# Patient Record
Sex: MALE | Race: WHITE | Employment: FULL TIME | URBAN - METROPOLITAN AREA
[De-identification: names, ages, dates, MRNs, and addresses within clinical notes are randomized per-mention and may not be internally consistent; named-entity substitution may affect disease eponyms.]

---

## 2017-01-26 ENCOUNTER — PATIENT MESSAGE (OUTPATIENT)
Dept: INTERNAL MEDICINE CLINIC | Age: 57
End: 2017-01-26

## 2017-01-26 NOTE — TELEPHONE ENCOUNTER
----- Message from Mikael Dumont sent at 1/26/2017  9:39 AM EST -----  Regarding: Visit Follow-Up Question  Contact: [phone]  Good morning Dr Fracisco Moore all is well with you. I am late but was suppose to have blood tests quarterly for anemia and looking at my record, last blood panel for everything was late July and you had ordered repeat in six months. Can I schedule with the lab for blood tests?       Stefanie Wallace

## 2017-02-06 DIAGNOSIS — R73.9 HYPERGLYCEMIA: ICD-10-CM

## 2017-02-06 DIAGNOSIS — E78.5 HYPERLIPIDEMIA, UNSPECIFIED HYPERLIPIDEMIA TYPE: Primary | ICD-10-CM

## 2017-02-06 DIAGNOSIS — D64.9 ANEMIA, UNSPECIFIED TYPE: ICD-10-CM

## 2017-02-06 DIAGNOSIS — R73.03 PREDIABETES: ICD-10-CM

## 2017-02-08 ENCOUNTER — LAB ONLY (OUTPATIENT)
Dept: INTERNAL MEDICINE CLINIC | Age: 57
End: 2017-02-08

## 2017-02-09 LAB
ALBUMIN SERPL-MCNC: 4.2 G/DL (ref 3.5–5.5)
ALBUMIN/GLOB SERPL: 1.8 {RATIO} (ref 1.1–2.5)
ALP SERPL-CCNC: 47 IU/L (ref 39–117)
ALT SERPL-CCNC: 22 IU/L (ref 0–44)
AST SERPL-CCNC: 21 IU/L (ref 0–40)
BASOPHILS # BLD AUTO: 0 X10E3/UL (ref 0–0.2)
BASOPHILS NFR BLD AUTO: 0 %
BILIRUB SERPL-MCNC: 0.6 MG/DL (ref 0–1.2)
BUN SERPL-MCNC: 11 MG/DL (ref 6–24)
BUN/CREAT SERPL: 12 (ref 9–20)
CALCIUM SERPL-MCNC: 9 MG/DL (ref 8.7–10.2)
CHLORIDE SERPL-SCNC: 98 MMOL/L (ref 96–106)
CHOLEST SERPL-MCNC: 223 MG/DL (ref 100–199)
CO2 SERPL-SCNC: 29 MMOL/L (ref 18–29)
CREAT SERPL-MCNC: 0.93 MG/DL (ref 0.76–1.27)
EOSINOPHIL # BLD AUTO: 0.1 X10E3/UL (ref 0–0.4)
EOSINOPHIL NFR BLD AUTO: 1 %
ERYTHROCYTE [DISTWIDTH] IN BLOOD BY AUTOMATED COUNT: 13 % (ref 12.3–15.4)
EST. AVERAGE GLUCOSE BLD GHB EST-MCNC: 120 MG/DL
GLOBULIN SER CALC-MCNC: 2.3 G/DL (ref 1.5–4.5)
GLUCOSE SERPL-MCNC: 91 MG/DL (ref 65–99)
HBA1C MFR BLD: 5.8 % (ref 4.8–5.6)
HCT VFR BLD AUTO: 40.9 % (ref 37.5–51)
HDLC SERPL-MCNC: 65 MG/DL
HGB BLD-MCNC: 13.1 G/DL (ref 12.6–17.7)
IMM GRANULOCYTES # BLD: 0 X10E3/UL (ref 0–0.1)
IMM GRANULOCYTES NFR BLD: 0 %
INTERPRETATION, 910389: NORMAL
LDLC SERPL CALC-MCNC: 122 MG/DL (ref 0–99)
LYMPHOCYTES # BLD AUTO: 1.8 X10E3/UL (ref 0.7–3.1)
LYMPHOCYTES NFR BLD AUTO: 40 %
MCH RBC QN AUTO: 28.7 PG (ref 26.6–33)
MCHC RBC AUTO-ENTMCNC: 32 G/DL (ref 31.5–35.7)
MCV RBC AUTO: 90 FL (ref 79–97)
MONOCYTES # BLD AUTO: 0.4 X10E3/UL (ref 0.1–0.9)
MONOCYTES NFR BLD AUTO: 8 %
NEUTROPHILS # BLD AUTO: 2.3 X10E3/UL (ref 1.4–7)
NEUTROPHILS NFR BLD AUTO: 51 %
PLATELET # BLD AUTO: 226 X10E3/UL (ref 150–379)
POTASSIUM SERPL-SCNC: 4.3 MMOL/L (ref 3.5–5.2)
PROT SERPL-MCNC: 6.5 G/DL (ref 6–8.5)
RBC # BLD AUTO: 4.57 X10E6/UL (ref 4.14–5.8)
SODIUM SERPL-SCNC: 138 MMOL/L (ref 134–144)
TRIGL SERPL-MCNC: 180 MG/DL (ref 0–149)
VLDLC SERPL CALC-MCNC: 36 MG/DL (ref 5–40)
WBC # BLD AUTO: 4.5 X10E3/UL (ref 3.4–10.8)

## 2017-02-14 ENCOUNTER — OFFICE VISIT (OUTPATIENT)
Dept: INTERNAL MEDICINE CLINIC | Age: 57
End: 2017-02-14

## 2017-02-14 VITALS
TEMPERATURE: 98.6 F | HEIGHT: 71 IN | OXYGEN SATURATION: 98 % | DIASTOLIC BLOOD PRESSURE: 61 MMHG | HEART RATE: 68 BPM | RESPIRATION RATE: 16 BRPM | BODY MASS INDEX: 27.44 KG/M2 | SYSTOLIC BLOOD PRESSURE: 105 MMHG | WEIGHT: 196 LBS

## 2017-02-14 DIAGNOSIS — E78.5 HYPERLIPIDEMIA, UNSPECIFIED HYPERLIPIDEMIA TYPE: ICD-10-CM

## 2017-02-14 DIAGNOSIS — Z11.59 NEED FOR HEPATITIS C SCREENING TEST: ICD-10-CM

## 2017-02-14 DIAGNOSIS — M47.22 OSTEOARTHRITIS OF SPINE WITH RADICULOPATHY, CERVICAL REGION: ICD-10-CM

## 2017-02-14 DIAGNOSIS — R07.81 PLEURITIC CHEST PAIN: Primary | ICD-10-CM

## 2017-02-14 DIAGNOSIS — R73.03 PREDIABETES: ICD-10-CM

## 2017-02-14 DIAGNOSIS — D64.9 ANEMIA, UNSPECIFIED TYPE: ICD-10-CM

## 2017-02-14 NOTE — MR AVS SNAPSHOT
Visit Information Date & Time Provider Department Dept. Phone Encounter #  
 2/14/2017  2:30 PM Kelly Arcos DO [facility] [id number] Follow-up Instructions Return for 6 month follow up w/ labs. Upcoming Health Maintenance Date Due Hepatitis C Screening 1960 FOBT Q 1 YEAR AGE 50-75 4/13/2010 INFLUENZA AGE 9 TO ADULT 8/1/2016 DTaP/Tdap/Td series (2 - Td) 7/15/2024 Allergies as of 2/14/2017  Review Complete On: 2/14/2017 By: Kelly Arcos DO No Known Allergies Current Immunizations  Never Reviewed Name Date Tdap 7/15/2014  9:13 AM  
  
 Not reviewed this visit You Were Diagnosed With   
  
 Codes Comments Pleuritic chest pain    -  Primary ICD-10-CM: R07.81 ICD-9-CM: 786.52 Hyperlipidemia, unspecified hyperlipidemia type     ICD-10-CM: E78.5 ICD-9-CM: 272.4 Prediabetes     ICD-10-CM: R73.03 
ICD-9-CM: 790.29 Vitals BP Pulse Temp Resp Height(growth percentile) Weight(growth percentile) 105/61 (BP 1 Location: Left arm, BP Patient Position: Sitting) 68 98.6 °F (37 °C) (Oral) 16 5' 11\" (1.803 m) 196 lb (88.9 kg) SpO2 BMI Smoking Status 98% 27.34 kg/m2 Never Smoker Vitals History BMI and BSA Data Body Mass Index Body Surface Area  
 27.34 kg/m 2 2.11 m 2 Preferred Pharmacy Pharmacy Name Phone [address] [phone] Your Updated Medication List  
  
   
This list is accurate as of: 2/14/17  2:54 PM.  Always use your most recent med list.  
  
  
  
  
 guaiFENesin-codeine 100-10 mg/5 mL solution Commonly known as:  ROBITUSSIN AC Take 5 mL by mouth nightly as needed for Cough. Max Daily Amount: 5 mL. meloxicam 15 mg tablet Commonly known as:  MOBIC  
  
 OTHER Take 2 Caps by mouth daily. Melaleuca peak performance nutritional pack. rosuvastatin 10 mg tablet Commonly known as:  CRESTOR Take 1 Tab by mouth nightly. Follow-up Instructions Return for 6 month follow up w/ labs. To-Do List   
 02/14/2017 ECG:  EKG, 12 LEAD, INITIAL   
  
 02/14/2017 Imaging:  XR CHEST PA LAT Patient Instructions Musculoskeletal Chest Pain: Care Instructions Your Care Instructions Chest pain is not always a sign that something is wrong with your heart or that you have another serious problem. The doctor thinks your chest pain is caused by strained muscles or ligaments, inflamed chest cartilage, or another problem in your chest, rather than by your heart. You may need more tests to find the cause of your chest pain. Follow-up care is a key part of your treatment and safety. Be sure to make and go to all appointments, and call your doctor if you are having problems. Its also a good idea to know your test results and keep a list of the medicines you take. How can you care for yourself at home? · Take pain medicines exactly as directed. ¨ If the doctor gave you a prescription medicine for pain, take it as prescribed. ¨ If you are not taking a prescription pain medicine, ask your doctor if you can take an over-the-counter medicine. · Rest and protect the sore area. · Stop, change, or take a break from any activity that may be causing your pain or soreness. · Put ice or a cold pack on the sore area for 10 to 20 minutes at a time. Try to do this every 1 to 2 hours for the next 3 days (when you are awake) or until the swelling goes down. Put a thin cloth between the ice and your skin. · After 2 or 3 days, apply a heating pad set on low or a warm cloth to the area that hurts. Some doctors suggest that you go back and forth between hot and cold. · Do not wrap or tape your ribs for support. This may cause you to take smaller breaths, which could increase your risk of lung problems. · Mentholated creams such as Bengay or Icy Hot may soothe sore muscles. Follow the instructions on the package. · Follow your doctor's instructions for exercising. · Gentle stretching and massage may help you get better faster. Stretch slowly to the point just before pain begins, and hold the stretch for at least 15 to 30 seconds. Do this 3 or 4 times a day. Stretch just after you have applied heat. · As your pain gets better, slowly return to your normal activities. Any increased pain may be a sign that you need to rest a while longer. When should you call for help? Call 911 anytime you think you may need emergency care. For example, call if: 
· You have chest pain or pressure. This may occur with: ¨ Sweating. ¨ Shortness of breath. ¨ Nausea or vomiting. ¨ Pain that spreads from the chest to the neck, jaw, or one or both shoulders or arms. ¨ Dizziness or lightheadedness. ¨ A fast or uneven pulse. After calling 911, chew 1 adult-strength aspirin. Wait for an ambulance. Do not try to drive yourself. · You have sudden chest pain and shortness of breath, or you cough up blood. Call your doctor now or seek immediate medical care if: 
· You have any trouble breathing. · Your chest pain gets worse. · Your chest pain occurs consistently with exercise and is relieved by rest. 
Watch closely for changes in your health, and be sure to contact your doctor if: 
· Your chest pain does not get better after 1 week. Where can you learn more? Go to http://magda-abdoul.info/. Enter V293 in the search box to learn more about \"Musculoskeletal Chest Pain: Care Instructions. \" Current as of: May 27, 2016 Content Version: 11.1 © 0090-3299 EDITD. Care instructions adapted under license by goAct (which disclaims liability or warranty for this information).  If you have questions about a medical condition or this instruction, always ask your healthcare professional. [address] any warranty or liability for your use of this information. Introducing [facility]! Dear Debbie Rogers: Thank you for requesting a [email or web address] account. Our records indicate that you already have an active [email or web address] account. You can access your account anytime at [email or web address] Did you know that you can access your hospital and ER discharge instructions at any time in [email or web address]? You can also review all of your test results from your hospital stay or ER visit. Additional Information If you have questions, please visit the Frequently Asked Questions section of the [email or web address] website at [email or web address] Remember, [email or web address] is NOT to be used for urgent needs. For medical emergencies, dial 911. Now available from your iPhone and Android! Please provide this summary of care documentation to your next provider. Your primary care clinician is listed as Tra Cline. If you have any questions after today's visit, please call [phone].

## 2017-02-14 NOTE — PATIENT INSTRUCTIONS
Musculoskeletal Chest Pain: Care Instructions  Your Care Instructions  Chest pain is not always a sign that something is wrong with your heart or that you have another serious problem. The doctor thinks your chest pain is caused by strained muscles or ligaments, inflamed chest cartilage, or another problem in your chest, rather than by your heart. You may need more tests to find the cause of your chest pain. Follow-up care is a key part of your treatment and safety. Be sure to make and go to all appointments, and call your doctor if you are having problems. Its also a good idea to know your test results and keep a list of the medicines you take. How can you care for yourself at home? · Take pain medicines exactly as directed. ¨ If the doctor gave you a prescription medicine for pain, take it as prescribed. ¨ If you are not taking a prescription pain medicine, ask your doctor if you can take an over-the-counter medicine. · Rest and protect the sore area. · Stop, change, or take a break from any activity that may be causing your pain or soreness. · Put ice or a cold pack on the sore area for 10 to 20 minutes at a time. Try to do this every 1 to 2 hours for the next 3 days (when you are awake) or until the swelling goes down. Put a thin cloth between the ice and your skin. · After 2 or 3 days, apply a heating pad set on low or a warm cloth to the area that hurts. Some doctors suggest that you go back and forth between hot and cold. · Do not wrap or tape your ribs for support. This may cause you to take smaller breaths, which could increase your risk of lung problems. · Mentholated creams such as Bengay or Icy Hot may soothe sore muscles. Follow the instructions on the package. · Follow your doctor's instructions for exercising. · Gentle stretching and massage may help you get better faster. Stretch slowly to the point just before pain begins, and hold the stretch for at least 15 to 30 seconds.  Do this 3 or 4 times a day. Stretch just after you have applied heat. · As your pain gets better, slowly return to your normal activities. Any increased pain may be a sign that you need to rest a while longer. When should you call for help? Call 911 anytime you think you may need emergency care. For example, call if:  · You have chest pain or pressure. This may occur with:  ¨ Sweating. ¨ Shortness of breath. ¨ Nausea or vomiting. ¨ Pain that spreads from the chest to the neck, jaw, or one or both shoulders or arms. ¨ Dizziness or lightheadedness. ¨ A fast or uneven pulse. After calling 911, chew 1 adult-strength aspirin. Wait for an ambulance. Do not try to drive yourself. · You have sudden chest pain and shortness of breath, or you cough up blood. Call your doctor now or seek immediate medical care if:  · You have any trouble breathing. · Your chest pain gets worse. · Your chest pain occurs consistently with exercise and is relieved by rest.  Watch closely for changes in your health, and be sure to contact your doctor if:  · Your chest pain does not get better after 1 week. Where can you learn more? Go to http://magda-abdoul.info/. Enter V293 in the search box to learn more about \"Musculoskeletal Chest Pain: Care Instructions. \"  Current as of: May 27, 2016  Content Version: 11.1  © 5223-3269 Framebridge. Care instructions adapted under license by [employer] (which disclaims liability or warranty for this information). If you have questions about a medical condition or this instruction, always ask your healthcare professional. [place] [address] any warranty or liability for your use of this information.

## 2017-02-14 NOTE — PROGRESS NOTES
Patient is in the office today for 6 month follow up. Do you have an Advance Directive no  Do you want more information no    1. Have you been to the ER, urgent care clinic since your last visit? Hospitalized since your last visit? No    2. Have you seen or consulted any other health care providers outside of the [address] [place] [address] since your last visit? Include any pap smears or colon screening.  No

## 2017-02-15 ENCOUNTER — TELEPHONE (OUTPATIENT)
Dept: INTERNAL MEDICINE CLINIC | Age: 57
End: 2017-02-15

## 2017-02-15 NOTE — TELEPHONE ENCOUNTER
Called patient,  verified. I advised of negative CXR and borderline EKG with abnormal T waves. Patient with CP only with taking deep breaths. He has had recent workout to upper chest and back. He says he has has MSK CP before and this felt different. Will try to locate actual image of EKG and plan to refer to cards. 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 In the interim ED precautions reviewed. Based on Hx patient low risk for ACS.

## 2017-02-16 ENCOUNTER — TELEPHONE (OUTPATIENT)
Dept: INTERNAL MEDICINE CLINIC | Age: 57
End: 2017-02-16

## 2017-02-16 NOTE — TELEPHONE ENCOUNTER
Pt request return call from Dr Laura Rao. He has further questions re abnormal EKG and plan of care. Also he is going out of town tomorrow 02/17/2017 thru Monday.

## 2017-02-16 NOTE — TELEPHONE ENCOUNTER
Called patient, he had questions about EKG. I advised that I have not seen the actual image but the report read nonspecific T was abnormality, borderline EKG. I advised that given clinical picture ACS unlikely, offered reassurance. Emergent ED precautions reviewed, patient agreed to comply.

## 2017-02-19 NOTE — PROGRESS NOTES
HISTORY OF PRESENT ILLNESS  Lind Skiff is a 64 y.o. male. HPI   Patient is in today for 6 month follow  Up on Anemia, HLD, prediabetes and migraines. He reports hx of kidney stones, hx pneumonia. He also reports hx of TIA 2 years ago. Patient works is retired fire/rescue, he is now a Captain     He says she recently did 21 day fast, h lost 21 lbs in the process. He reports recent episode of left sided chest cavity pain, \"nagging\" with deep breaths, 10/10, sharp, stabbing and unrelenting. He says it lasted a few minutes, he says that he got on his knees a and sat up and this helped. He denies inciting events. He does report that he had done back exercises a few days before. He says he has had intercostal pain previously and this felt different. He denies calf pain, swelling or erythema  Today he denies pain    He reports hx of cervical DDD with radiculopathy, b/l knee surgeries. Patient does have a hx of smoking cigars 3-4 times per year. He says he drinks alcohol 3-4 times per year. Patient was adopted and fhx is unknown. He is taking his medications with no adverse side effects, he is requesting refills today. He denies CP, SOB, dyspnea, edema, N/T or myalgias. Patient says he was not fasting when he had his labs drawn. No Known Allergies    Past Medical History   Diagnosis Date    Calculus of kidney     Headache     Joint pain     Migraine     Night sweats     Urine frequency        Family History   Problem Relation Age of Onset    Adopted: Yes       Social History   Substance Use Topics    Smoking status: Never Smoker    Smokeless tobacco: Never Used    Alcohol use 0.0 oz/week     0 Glasses of wine per week      Comment: socially         Current Outpatient Prescriptions   Medication Sig    rosuvastatin (CRESTOR) 10 mg tablet Take 1 Tab by mouth nightly.  meloxicam (MOBIC) 15 mg tablet     OTHER Take 2 Caps by mouth daily.  Melaleuca peak performance nutritional pack.    guaiFENesin-codeine (ROBITUSSIN AC) 100-10 mg/5 mL solution Take 5 mL by mouth nightly as needed for Cough. Max Daily Amount: 5 mL. No current facility-administered medications for this visit. Past Surgical History   Procedure Laterality Date    Hx orthopaedic       shoulders and knees scoped.  Hx other surgical       inguinal hernia.  Hx mohs procedure Left 2007     ROS  Constitutional: Negative for fever and chills. HENT: Negative for tinnitus. Eyes: Negative for blurred vision and double vision. Respiratory: Negative for cough and shortness of breath. Cardiovascular: See HPI. Negative for chest pain and leg swelling. Gastrointestinal: Negative for nausea, vomiting and diarrhea. Genitourinary: Negative for dysuria and flank pain. Musculoskeletal: See HPI  Neurological: See HPI. Negative for dizziness, tremors, sensory change, speech change, focal weakness and headaches. Psychiatric/Behavioral: Negative for depression and suicidal ideas. Visit Vitals    /61 (BP 1 Location: Left arm, BP Patient Position: Sitting)    Pulse 68    Temp 98.6 °F (37 °C) (Oral)    Resp 16    Ht 5' 11\" (1.803 m)    Wt 196 lb (88.9 kg)    SpO2 98%    BMI 27.34 kg/m2     Physical Exam  Constitutional: he is oriented to person, place, and time and well-developed, well-nourished, and in no distress. Head: Normocephalic and atraumatic. Right Ear: External ear normal.   Left Ear: External ear normal.   Eyes: Pupils are equal, round, and reactive to light. Neck: Normal range of motion. Cardiovascular: Normal rate, regular rhythm, normal heart sounds and intact distal pulses. No murmur heard. Pulmonary/Chest: Effort normal and breath sounds normal. No respiratory distress. Musculoskeletal: Normal range of motion. he exhibits no edema, tenderness, erythema. Neurological: he is alert and oriented to person, place, and time. Gait normal.   Skin: Skin is warm and dry. Psychiatric: Mood, memory, affect and judgment normal.     Lab Results   Component Value Date/Time    WBC 4.5 02/08/2017 12:00 AM    HGB 13.1 02/08/2017 12:00 AM    HCT 40.9 02/08/2017 12:00 AM    PLATELET 541 20/87/3997 12:00 AM    MCV 90 02/08/2017 12:00 AM     Lab Results   Component Value Date/Time    Sodium 138 02/08/2017 12:00 AM    Potassium 4.3 02/08/2017 12:00 AM    Chloride 98 02/08/2017 12:00 AM    CO2 29 02/08/2017 12:00 AM    Anion gap 6 10/20/2016 08:17 AM    Glucose 91 02/08/2017 12:00 AM    BUN 11 02/08/2017 12:00 AM    Creatinine 0.93 02/08/2017 12:00 AM    BUN/Creatinine ratio 12 02/08/2017 12:00 AM    GFR est  02/08/2017 12:00 AM    GFR est non-AA 91 02/08/2017 12:00 AM    Calcium 9.0 02/08/2017 12:00 AM    Bilirubin, total 0.6 02/08/2017 12:00 AM    AST (SGOT) 21 02/08/2017 12:00 AM    Alk. phosphatase 47 02/08/2017 12:00 AM    Protein, total 6.5 02/08/2017 12:00 AM    Albumin 4.2 02/08/2017 12:00 AM    Globulin 3.0 10/20/2016 08:17 AM    A-G Ratio 1.8 02/08/2017 12:00 AM    ALT (SGPT) 22 02/08/2017 12:00 AM     Lab Results   Component Value Date/Time    Cholesterol, total 223 02/08/2017 12:00 AM    HDL Cholesterol 65 02/08/2017 12:00 AM    LDL, calculated 122 02/08/2017 12:00 AM    VLDL, calculated 36 02/08/2017 12:00 AM    Triglyceride 180 02/08/2017 12:00 AM    CHOL/HDL Ratio 3.1 09/10/2014 05:06 AM     Lab Results   Component Value Date/Time    TSH 0.863 08/01/2016 08:47 AM     Lab Results   Component Value Date/Time    Hemoglobin A1c 5.8 02/08/2017 12:00 AM     ASSESSMENT and PLAN    ICD-10-CM ICD-9-CM    1. Pleuritic chest pain R07.81 786.52 XR CHEST PA LAT      EKG, 12 LEAD, INITIAL   2. Hyperlipidemia, unspecified hyperlipidemia type E78.5 272.4 LIPID PANEL   3. Prediabetes U85.09 294.89 METABOLIC PANEL, COMPREHENSIVE      HEMOGLOBIN A1C WITH EAG   4. Anemia, unspecified type D64.9 285.9 CBC WITH AUTOMATED DIFF      METABOLIC PANEL, COMPREHENSIVE   5.  Osteoarthritis of spine with radiculopathy, cervical region M47.22 721.0    6. Need for hepatitis C screening test Z11.59 V73.89 HEPATITIS C AB     -Ctn current healthy regimen  -CP, likely MSK given hx and PE. Pain with deep breath following back exercises days prior. Unlikely to be angina, in the absence of leg pain or swelling decrease likelihood of PE, no URI symptoms so unlikely to me pulm in nature. -RTC 6 mos follow up w/ labs      -Patient classified as obese  -Advised continued exercise and dietary modifications.   -Patient agrees with assessment and plan    According to the CDC an increased BMI can lead to \"all-causes of death (mortality), High blood pressure (Hypertension), High LDL cholesterol, low HDL cholesterol, or high levels of triglycerides (Dyslipidemia), Type 2 diabetes, Coronary heart disease, Stroke, Gallbladder disease, Osteoarthritis (a breakdown of cartilage and bone within a joint), Sleep apnea and breathing problems, Chronic inflammation and increased oxidative stress, some cancers (endometrial, breast, colon, kidney, gallbladder, and liver), Low quality of life, Mental illness such as clinical depression, anxiety, and other mental disorders and Body pain and difficulty with physical functioning. \"    BMI Weight Status   Below 18.5 Underweight   18.5 - 24.9 Normal or Healthy Weight   25.0 - 29.9 Overweight   30.0 and Above Obese   40.0 and Above Morbid Obesity     Additional Instructions: The patient understands that they should contact the office at any time if any questions or concerns develop. They are also aware that they can call our main office number at [phone] at any time if they would like to address any concerns with the physician. They also understand that they should dial 911 if any acute emergency arises. The patient understands that they should give us a minimum of 48 hours to complete prescription refills once they are requested.   The patient has also been instructed to contact us by calling the main office number if they have not received feedback within 2 weeks of having any tests completed. The patient is a aware that they should read all package insert information when picking up the medications and that they should consult the pharmacist of a physician if they have any questions or concerns regarding the prescribed medications. Discussed with the patient new medications given and patient instructed to read pharmacy literature regarding side effects and drug interactions. Instructions for taking the medications were provided to the patient and the consequences of not taking it. Follow-up Disposition:   Return if symptoms worsen or fail to improve. Risk and benefits of new medication discussed in detail when indicated, patient was given the opportunity to ask questions   AVS provided  reviewed diet, exercise and weight control when indicated  Alarm signals discussed. ER precautions reviewed when indicated  Plan of care reviewed with patient. Understanding verbalized and they are in agreement with plan of care. Please note that this document was created with voice recognition software. Unrecognized errors in transcription may be present.     Derik Lyon, DO

## 2017-02-27 DIAGNOSIS — R07.81 PLEURITIC CHEST PAIN: ICD-10-CM

## 2021-04-21 ENCOUNTER — VIRTUAL VISIT (OUTPATIENT)
Dept: FAMILY MEDICINE CLINIC | Age: 61
End: 2021-04-21
Payer: COMMERCIAL

## 2021-04-21 DIAGNOSIS — R20.2 NUMBNESS AND TINGLING IN LEFT ARM: ICD-10-CM

## 2021-04-21 DIAGNOSIS — E55.9 VITAMIN D DEFICIENCY: ICD-10-CM

## 2021-04-21 DIAGNOSIS — R20.0 NUMBNESS AND TINGLING OF LEFT LEG: ICD-10-CM

## 2021-04-21 DIAGNOSIS — M47.22 OSTEOARTHRITIS OF SPINE WITH RADICULOPATHY, CERVICAL REGION: ICD-10-CM

## 2021-04-21 DIAGNOSIS — R20.2 NUMBNESS AND TINGLING OF LEFT LEG: ICD-10-CM

## 2021-04-21 DIAGNOSIS — R29.898 WEAKNESS OF LEFT LEG: ICD-10-CM

## 2021-04-21 DIAGNOSIS — Z11.59 ENCOUNTER FOR HEPATITIS C SCREENING TEST FOR LOW RISK PATIENT: ICD-10-CM

## 2021-04-21 DIAGNOSIS — G43.009 MIGRAINE WITHOUT AURA AND WITHOUT STATUS MIGRAINOSUS, NOT INTRACTABLE: ICD-10-CM

## 2021-04-21 DIAGNOSIS — R73.03 PREDIABETES: ICD-10-CM

## 2021-04-21 DIAGNOSIS — J35.1 SWELLING OF TONSIL: ICD-10-CM

## 2021-04-21 DIAGNOSIS — E78.5 HYPERLIPIDEMIA, UNSPECIFIED HYPERLIPIDEMIA TYPE: Primary | ICD-10-CM

## 2021-04-21 DIAGNOSIS — R20.0 NUMBNESS AND TINGLING IN LEFT ARM: ICD-10-CM

## 2021-04-21 PROCEDURE — 99204 OFFICE O/P NEW MOD 45 MIN: CPT | Performed by: NURSE PRACTITIONER

## 2021-04-21 RX ORDER — NAPROXEN 500 MG/1
500 TABLET ORAL 2 TIMES DAILY WITH MEALS
Qty: 30 TAB | Refills: 0 | Status: SHIPPED | OUTPATIENT
Start: 2021-04-21 | End: 2021-07-12

## 2021-04-21 RX ORDER — AMOXICILLIN 500 MG/1
TABLET, FILM COATED ORAL
COMMUNITY
Start: 2021-04-15 | End: 2021-04-28

## 2021-04-21 RX ORDER — DOXYCYCLINE 100 MG/1
100 TABLET ORAL 2 TIMES DAILY
Qty: 20 TAB | Refills: 0 | Status: SHIPPED | OUTPATIENT
Start: 2021-04-21 | End: 2021-05-01

## 2021-04-21 NOTE — PROGRESS NOTES
Nieves Hickman presents today for   Chief Complaint   Patient presents with    Neck Pain     Visit to establish care and needs a referral for Neuro.  Other     c/o or swollen tonsils and submadibular area. ( Recent visit to Urgent care ) 2 days of abx left. Virtual/telephone visit    Depression Screening:  3 most recent PHQ Screens 4/21/2021   Little interest or pleasure in doing things Not at all   Feeling down, depressed, irritable, or hopeless Not at all   Total Score PHQ 2 0       Learning Assessment:  Learning Assessment 4/21/2021   PRIMARY LEARNER Patient   HIGHEST LEVEL OF EDUCATION - PRIMARY LEARNER  4 YEARS OF COLLEGE   BARRIERS PRIMARY LEARNER NONE   CO-LEARNER CAREGIVER No   PRIMARY LANGUAGE ENGLISH   LEARNER PREFERENCE PRIMARY DEMONSTRATION   ANSWERED BY Patient   RELATIONSHIP SELF       Fall Risk  Fall Risk Assessment, last 12 mths 4/21/2021   Able to walk? Yes   Fall in past 12 months? 0   Do you feel unsteady? 0       Travel Screening:   Travel Screening     Question   Response    In the last month, have you been in contact with someone who was confirmed or suspected to have Coronavirus / COVID-19? No / Unsure    Have you had a COVID-19 viral test in the last 14 days? Yes - Negative result (4/7/21 related travel)    Do you have any of the following new or worsening symptoms? None of these    Have you traveled internationally or domestically in the last month? No      Travel History   Travel since 03/21/21     No documented travel since 03/21/21          Health Maintenance reviewed and discussed and ordered per Provider. Health Maintenance Due   Topic Date Due    Hepatitis C Screening  Never done    COVID-19 Vaccine (1) Never done    Shingrix Vaccine Age 50> (1 of 2) Never done    A1C test (Diabetic or Prediabetic)  02/08/2018   . Coordination of Care:  1. Have you been to the ER, urgent care clinic since your last visit? Hospitalized since your last visit?  4/15/21 [facility] r/t swollin tonsil. 2. Have you seen or consulted any other health care providers outside of the [address] [place] [address] since your last visit? Include any pap smears or colon screening. Noted above.

## 2021-04-21 NOTE — PROGRESS NOTES
Mary Jane Byers is a 64 y.o. male who was seen by synchronous (real-time) audio-video technology on 4/21/2021 for Neck Pain (Visit to establish care and needs a referral for Neuro.) and Other (c/o or swollen tonsils and submadibular area. ( Recent visit to Urgent care ) 2 days of abx left. )  New patient - has not had a provider. He reports a physical once a year and he is usually pretty healthy. He was in [place] on a work assignment in Oct 2020 and went to the ER for Neck pain which they attributed to a migraine. He was having some vision changes at this time. - He has had a MRI and x-ray of his chest, brain and back. He went to ER in [place] and was told he needed to see a neurosurgeon so he is in need of a referral. He does have some radiating pain in his left arm and left leg with some numbness since then. He does have some weakness in his left leg. He has no problem with urinating or having a bowel movement. He denies any chest pain and or shortness of breath. He reports he continues to have headaches. His wife does work in PT and given him some light traction which helps some with his neck pain but not with his headaches. He reports his headaches are positional and occur when he moves a certain way as if he turns his head a certain way the pain will shoot down his left arm. He reports significant arthritis in his neck. He is taking ibuprofen for his headache and neck pain and he reports this does very little. He did take 600 mg Ibuprofen but this upset his stomach. He has tried Aleve but this has not helped. He also went to urgent care about a week ago for his tonsils. He was started on penicillin for 10 days. He reports his right tonsil has some pus pockets. He repots he has had no change since starting the medication and he still has 3 days left. He denies any fevers. He reports his pain level is 1-2. He was not able to get his COVID vaccine because of the gland that was swollen in his neck.    He has had a vitamin D deficiency and was taking vitamin D but has not had this in a while. Assessment & Plan:   Diagnoses and all orders for this visit:    1. Hyperlipidemia, unspecified hyperlipidemia type  -     METABOLIC PANEL, COMPREHENSIVE; Future  -     LIPID PANEL; Future  -     CBC WITH AUTOMATED DIFF; Future  -     TSH 3RD GENERATION; Future  -     T4, FREE; Future    2. Prediabetes  -     METABOLIC PANEL, COMPREHENSIVE; Future  -     LIPID PANEL; Future  -     CBC WITH AUTOMATED DIFF; Future  -     TSH 3RD GENERATION; Future  -     T4, FREE; Future  -     HEMOGLOBIN A1C WITH EAG; Future    3. Migraine without aura and without status migrainosus, not intractable  -     naproxen (NAPROSYN) 500 mg tablet; Take 1 Tab by mouth two (2) times daily (with meals). 4. Osteoarthritis of spine with radiculopathy, cervical region  -     naproxen (NAPROSYN) 500 mg tablet; Take 1 Tab by mouth two (2) times daily (with meals). -     REFERRAL TO NEUROSURGERY    5. Swelling of tonsil  -     doxycycline (ADOXA) 100 mg tablet; Take 1 Tab by mouth two (2) times a day for 10 days. -     CBC WITH AUTOMATED DIFF; Future  -     REFERRAL TO ENT-OTOLARYNGOLOGY    6. Encounter for hepatitis C screening test for low risk patient  -     HEPATITIS C AB; Future    7. Vitamin D deficiency  -     VITAMIN D, 25 HYDROXY; Future    8. Numbness and tingling in left arm  -     REFERRAL TO NEUROSURGERY    9. Numbness and tingling of left leg  -     REFERRAL TO NEUROSURGERY    10. Weakness of left leg  -     REFERRAL TO NEUROSURGERY    Medication, side effects, possible allergic reactions and warnings reviewed with patient. Patient verbalized understanding. Encouraged to take a probiotic mid-day. Aleve with food. Subjective:       Prior to Admission medications    Medication Sig Start Date End Date Taking?  Authorizing Provider   amoxicillin 500 mg tab TAKE 1 TABLET BY MOUTH TWICE DAILY 4/15/21  Yes Provider, Historical   doxycycline (ADOXA) 100 mg tablet Take 1 Tab by mouth two (2) times a day for 10 days. 4/21/21 5/1/21 Yes Arielle Petit NP   naproxen (NAPROSYN) 500 mg tablet Take 1 Tab by mouth two (2) times daily (with meals). 4/21/21  Yes Alvina GREENBERG, NP   tadalafiL (CIALIS) 5 mg tablet Take 1 Tab by mouth daily. For ED. Indications: the inability to have an erection 6/17/20  Yes jR aPrtida MD     Patient Active Problem List   Diagnosis Code    Osteoarthritis of spine with radiculopathy, cervical region M47.22    Hx of herpes zoster Z86.19    Hx-TIA (transient ischemic attack) Z86.73    Frequency of urination R35.0    Migraine without status migrainosus, not intractable G43.909    Headache(784.0) R51    TIA (transient ischemic attack) G45.9    Hyperlipidemia E78.5    Prediabetes R73.03    Anemia D64.9     Patient Active Problem List    Diagnosis Date Noted    Hyperlipidemia 08/02/2016    Prediabetes 08/02/2016    Anemia 08/02/2016    Osteoarthritis of spine with radiculopathy, cervical region 07/28/2016    Hx of herpes zoster 07/28/2016    Hx-TIA (transient ischemic attack) 07/28/2016    Frequency of urination 07/28/2016    Migraine without status migrainosus, not intractable 07/28/2016    Headache(784.0) 09/09/2014    TIA (transient ischemic attack) 09/09/2014     Current Outpatient Medications   Medication Sig Dispense Refill    amoxicillin 500 mg tab TAKE 1 TABLET BY MOUTH TWICE DAILY      doxycycline (ADOXA) 100 mg tablet Take 1 Tab by mouth two (2) times a day for 10 days. 20 Tab 0    naproxen (NAPROSYN) 500 mg tablet Take 1 Tab by mouth two (2) times daily (with meals). 30 Tab 0    tadalafiL (CIALIS) 5 mg tablet Take 1 Tab by mouth daily. For ED.   Indications: the inability to have an erection 90 Tab 3     No Known Allergies  Past Medical History:   Diagnosis Date    Calculus of kidney     Headache     Joint pain     Migraine     Night sweats     Urine frequency      Past Surgical History:   Procedure Laterality Date    HX MOHS PROCEDURES Left 2007    HX ORTHOPAEDIC      shoulders and knees scoped.  HX OTHER SURGICAL      inguinal hernia. Family History   Adopted: Yes     Social History     Tobacco Use    Smoking status: Never Smoker    Smokeless tobacco: Never Used   Substance Use Topics    Alcohol use: Yes     Alcohol/week: 0.0 standard drinks     Comment: socially        Review of Systems   Constitutional: Negative for fever. HENT: Positive for sore throat. Eyes: Negative for blurred vision. Respiratory: Negative for cough and shortness of breath. Cardiovascular: Negative for chest pain. Gastrointestinal: Negative for abdominal pain, blood in stool, nausea and vomiting. Genitourinary: Negative. Musculoskeletal: Positive for back pain and neck pain. Neurological: Positive for tingling, weakness and headaches. Objective:   No flowsheet data found.      [INSTRUCTIONS:  \"[x]\" Indicates a positive item  \"[]\" Indicates a negative item  -- DELETE ALL ITEMS NOT EXAMINED]    Constitutional: [x] Appears well-developed and well-nourished [x] No apparent distress      [] Abnormal -     Mental status: [x] Alert and awake  [x] Oriented to person/place/time [x] Able to follow commands    [] Abnormal -     Eyes:   EOM    [x]  Normal    [] Abnormal -   Sclera  [x]  Normal    [] Abnormal -          Discharge [x]  None visible   [] Abnormal -     HENT: [x] Normocephalic, atraumatic  [] Abnormal -   [x] Mouth/Throat: Mucous membranes are moist    External Ears [x] Normal  [] Abnormal -    Neck: [x] No visualized mass [] Abnormal -     Pulmonary/Chest: [x] Respiratory effort normal   [x] No visualized signs of difficulty breathing or respiratory distress        [] Abnormal -      Musculoskeletal:   [x] Normal gait with no signs of ataxia         [x] Normal range of motion of neck        [] Abnormal -     Neurological:        [x] No Facial Asymmetry (Cranial nerve 7 motor function) (limited exam due to video visit)          [x] No gaze palsy        [] Abnormal -          Skin:        [x] No significant exanthematous lesions or discoloration noted on facial skin         [] Abnormal -            Psychiatric:       [x] Normal Affect [] Abnormal -        [x] No Hallucinations    We discussed the expected course, resolution and complications of the diagnosis(es) in detail. Medication risks, benefits, costs, interactions, and alternatives were discussed as indicated. I advised him to contact the office if his condition worsens, changes or fails to improve as anticipated. He expressed understanding with the diagnosis(es) and plan. Dino Aviles, was evaluated through a synchronous (real-time) audio-video encounter. The patient (or guardian if applicable) is aware that this is a billable service. Verbal consent to proceed has been obtained within the past 12 months. The visit was conducted pursuant to the emergency declaration under the [address] [place] [address] authority and the [email or web address] General Act. Patient identification was verified, and a caregiver was present when appropriate. The patient was located in a state where the provider was credentialed to provide care.       Bro Loera NP

## 2021-04-24 ENCOUNTER — HOSPITAL ENCOUNTER (OUTPATIENT)
Dept: LAB | Age: 61
Discharge: HOME | End: 2021-04-24
Payer: COMMERCIAL

## 2021-04-24 DIAGNOSIS — R73.03 PREDIABETES: ICD-10-CM

## 2021-04-24 DIAGNOSIS — E78.5 HYPERLIPIDEMIA, UNSPECIFIED HYPERLIPIDEMIA TYPE: ICD-10-CM

## 2021-04-24 DIAGNOSIS — E55.9 VITAMIN D DEFICIENCY: ICD-10-CM

## 2021-04-24 DIAGNOSIS — J35.1 SWELLING OF TONSIL: ICD-10-CM

## 2021-04-24 DIAGNOSIS — Z11.59 ENCOUNTER FOR HEPATITIS C SCREENING TEST FOR LOW RISK PATIENT: ICD-10-CM

## 2021-04-24 LAB
25(OH)D3 SERPL-MCNC: 20.9 NG/ML (ref 30–100)
ALBUMIN SERPL-MCNC: 4 G/DL (ref 3.4–5)
ALBUMIN/GLOB SERPL: 1.3 {RATIO} (ref 0.8–1.7)
ALP SERPL-CCNC: 48 U/L (ref 45–117)
ALT SERPL-CCNC: 39 U/L (ref 16–61)
ANION GAP SERPL CALC-SCNC: 3 MMOL/L (ref 3–18)
AST SERPL-CCNC: 24 U/L (ref 10–38)
BASOPHILS # BLD: 0 K/UL (ref 0–0.1)
BASOPHILS NFR BLD: 1 % (ref 0–2)
BILIRUB SERPL-MCNC: 1 MG/DL (ref 0.2–1)
BUN SERPL-MCNC: 11 MG/DL (ref 7–18)
BUN/CREAT SERPL: 14 (ref 12–20)
CALCIUM SERPL-MCNC: 8.5 MG/DL (ref 8.5–10.1)
CHLORIDE SERPL-SCNC: 104 MMOL/L (ref 100–111)
CHOLEST SERPL-MCNC: 250 MG/DL
CO2 SERPL-SCNC: 31 MMOL/L (ref 21–32)
CREAT SERPL-MCNC: 0.76 MG/DL (ref 0.6–1.3)
DIFFERENTIAL METHOD BLD: ABNORMAL
EOSINOPHIL # BLD: 0.1 K/UL (ref 0–0.4)
EOSINOPHIL NFR BLD: 1 % (ref 0–5)
ERYTHROCYTE [DISTWIDTH] IN BLOOD BY AUTOMATED COUNT: 12.8 % (ref 11.6–14.5)
EST. AVERAGE GLUCOSE BLD GHB EST-MCNC: 111 MG/DL
GLOBULIN SER CALC-MCNC: 3 G/DL (ref 2–4)
GLUCOSE SERPL-MCNC: 78 MG/DL (ref 74–99)
HBA1C MFR BLD: 5.5 % (ref 4.2–5.6)
HCT VFR BLD AUTO: 39.2 % (ref 36–48)
HDLC SERPL-MCNC: 79 MG/DL (ref 40–60)
HDLC SERPL: 3.2 {RATIO} (ref 0–5)
HGB BLD-MCNC: 12.9 G/DL (ref 13–16)
LDLC SERPL CALC-MCNC: 149.8 MG/DL (ref 0–100)
LIPID PROFILE,FLP: ABNORMAL
LYMPHOCYTES # BLD: 1.5 K/UL (ref 0.9–3.6)
LYMPHOCYTES NFR BLD: 35 % (ref 21–52)
MCH RBC QN AUTO: 29.1 PG (ref 24–34)
MCHC RBC AUTO-ENTMCNC: 32.9 G/DL (ref 31–37)
MCV RBC AUTO: 88.5 FL (ref 74–97)
MONOCYTES # BLD: 0.5 K/UL (ref 0.05–1.2)
MONOCYTES NFR BLD: 12 % (ref 3–10)
NEUTS SEG # BLD: 2.3 K/UL (ref 1.8–8)
NEUTS SEG NFR BLD: 52 % (ref 40–73)
PLATELET # BLD AUTO: 222 K/UL (ref 135–420)
PMV BLD AUTO: 9.9 FL (ref 9.2–11.8)
POTASSIUM SERPL-SCNC: 4.1 MMOL/L (ref 3.5–5.5)
PROT SERPL-MCNC: 7 G/DL (ref 6.4–8.2)
RBC # BLD AUTO: 4.43 M/UL (ref 4.35–5.65)
SODIUM SERPL-SCNC: 138 MMOL/L (ref 136–145)
T4 FREE SERPL-MCNC: 1 NG/DL (ref 0.7–1.5)
TRIGL SERPL-MCNC: 106 MG/DL (ref ?–150)
TSH SERPL DL<=0.05 MIU/L-ACNC: 1.01 UIU/ML (ref 0.36–3.74)
VLDLC SERPL CALC-MCNC: 21.2 MG/DL
WBC # BLD AUTO: 4.4 K/UL (ref 4.6–13.2)

## 2021-04-24 PROCEDURE — 36415 COLL VENOUS BLD VENIPUNCTURE: CPT

## 2021-04-24 PROCEDURE — 84443 ASSAY THYROID STIM HORMONE: CPT

## 2021-04-24 PROCEDURE — 80053 COMPREHEN METABOLIC PANEL: CPT

## 2021-04-24 PROCEDURE — 84439 ASSAY OF FREE THYROXINE: CPT

## 2021-04-24 PROCEDURE — 80061 LIPID PANEL: CPT

## 2021-04-24 PROCEDURE — 85025 COMPLETE CBC W/AUTO DIFF WBC: CPT

## 2021-04-24 PROCEDURE — 86803 HEPATITIS C AB TEST: CPT

## 2021-04-24 PROCEDURE — 83036 HEMOGLOBIN GLYCOSYLATED A1C: CPT

## 2021-04-24 PROCEDURE — 82306 VITAMIN D 25 HYDROXY: CPT

## 2021-04-26 LAB
HCV AB SER IA-ACNC: 0.11 INDEX
HCV AB SERPL QL IA: NEGATIVE
HCV COMMENT,HCGAC: NORMAL

## 2021-04-28 ENCOUNTER — TELEPHONE (OUTPATIENT)
Dept: FAMILY MEDICINE CLINIC | Age: 61
End: 2021-04-28

## 2021-04-28 NOTE — TELEPHONE ENCOUNTER
Patient said that the antibiotic is not working he still feel the samehugh and he has an headache.  Please advise

## 2021-05-20 ENCOUNTER — VIRTUAL VISIT (OUTPATIENT)
Dept: FAMILY MEDICINE CLINIC | Age: 61
End: 2021-05-20
Payer: COMMERCIAL

## 2021-05-20 DIAGNOSIS — E78.5 HYPERLIPIDEMIA, UNSPECIFIED HYPERLIPIDEMIA TYPE: ICD-10-CM

## 2021-05-20 DIAGNOSIS — Z71.2 ENCOUNTER TO DISCUSS TEST RESULTS: Primary | ICD-10-CM

## 2021-05-20 DIAGNOSIS — E55.9 VITAMIN D INSUFFICIENCY: ICD-10-CM

## 2021-05-20 DIAGNOSIS — J35.1 SWELLING OF TONSIL: ICD-10-CM

## 2021-05-20 DIAGNOSIS — G43.009 MIGRAINE WITHOUT AURA AND WITHOUT STATUS MIGRAINOSUS, NOT INTRACTABLE: ICD-10-CM

## 2021-05-20 DIAGNOSIS — M47.22 OSTEOARTHRITIS OF SPINE WITH RADICULOPATHY, CERVICAL REGION: ICD-10-CM

## 2021-05-20 PROCEDURE — 99214 OFFICE O/P EST MOD 30 MIN: CPT | Performed by: NURSE PRACTITIONER

## 2021-05-20 RX ORDER — TADALAFIL 5 MG/1
5 TABLET ORAL DAILY
COMMUNITY
End: 2021-07-12

## 2021-05-20 RX ORDER — METHOCARBAMOL 750 MG/1
TABLET, FILM COATED ORAL
COMMUNITY
Start: 2021-05-18 | End: 2021-07-12

## 2021-05-20 NOTE — PROGRESS NOTES
Mariangel Milian is a 64 y.o. male who was seen by synchronous (real-time) audio-video technology on 5/20/2021 for Follow-up (1 month), Headache (no improvement), Back Pain (pain level  4/10), and Results (discuss lab results)  He has been for his labs- his total cholesterol was in the 160's back in December. ENT - he has seen - he will have his tonsils removed tomorrow and pathology will be completed. Back pain, neck pain, and Headache- he did see the neurosurgeon and was given methocarbamol as the neurosurgeon felt the headache was coming from his neck pain. He has MRI's next week physical therapy, and a cortisone shot. Pain level today is 3/10 and for his neck pain 3-4/10. He does not have pain in his back today. Assessment & Plan:   Diagnoses and all orders for this visit:    1. Encounter to discuss test results    2. Hyperlipidemia, unspecified hyperlipidemia type  -     METABOLIC PANEL, COMPREHENSIVE; Future  -     LIPID PANEL; Future    3. Migraine without aura and without status migrainosus, not intractable    4. Osteoarthritis of spine with radiculopathy, cervical region    5. Swelling of tonsil    6. Vitamin D insufficiency  -     VITAMIN D, 25 HYDROXY; Future      Follow up with ENT and neurosurgeon. OTC vitamin D at 2000 units a day. Subjective:       Prior to Admission medications    Medication Sig Start Date End Date Taking? Authorizing Provider   methocarbamoL (ROBAXIN) 750 mg tablet TAKE 1 TABLET BY MOUTH FOUR TIMES DAILY 5/18/21  Yes Provider, Historical   tadalafiL (Cialis) 5 mg tablet Take 5 mg by mouth daily. Yes Provider, Historical   naproxen (NAPROSYN) 500 mg tablet Take 1 Tab by mouth two (2) times daily (with meals). 4/21/21  Yes Valerie GREENBERG NP   tadalafiL (CIALIS) 5 mg tablet Take 1 Tab by mouth daily. For ED.   Indications: the inability to have an erection 6/17/20   Andrei Richmond MD     Patient Active Problem List   Diagnosis Code    Osteoarthritis of spine with radiculopathy, cervical region M47.22    Hx of herpes zoster Z86.19    Hx-TIA (transient ischemic attack) Z86.73    Frequency of urination R35.0    Migraine without status migrainosus, not intractable G43.909    Headache(784.0) R51    TIA (transient ischemic attack) G45.9    Hyperlipidemia E78.5    Prediabetes R73.03    Anemia D64.9    Calculus of kidney N20.0    Night sweats R61    Urine frequency R35.0     Patient Active Problem List    Diagnosis Date Noted    Calculus of kidney     Night sweats     Urine frequency     Hyperlipidemia 08/02/2016    Prediabetes 08/02/2016    Anemia 08/02/2016    Osteoarthritis of spine with radiculopathy, cervical region 07/28/2016    Hx of herpes zoster 07/28/2016    Hx-TIA (transient ischemic attack) 07/28/2016    Frequency of urination 07/28/2016    Migraine without status migrainosus, not intractable 07/28/2016    Headache(784.0) 09/09/2014    TIA (transient ischemic attack) 09/09/2014     Current Outpatient Medications   Medication Sig Dispense Refill    methocarbamoL (ROBAXIN) 750 mg tablet TAKE 1 TABLET BY MOUTH FOUR TIMES DAILY      tadalafiL (Cialis) 5 mg tablet Take 5 mg by mouth daily.  naproxen (NAPROSYN) 500 mg tablet Take 1 Tab by mouth two (2) times daily (with meals). 30 Tab 0    tadalafiL (CIALIS) 5 mg tablet Take 1 Tab by mouth daily. For ED. Indications: the inability to have an erection 90 Tab 3     No Known Allergies  Past Medical History:   Diagnosis Date    Calculus of kidney     Headache     Joint pain     Migraine     Night sweats     Urine frequency      Past Surgical History:   Procedure Laterality Date    HX MOHS PROCEDURES Left 2007    HX ORTHOPAEDIC      shoulders and knees scoped.  HX OTHER SURGICAL      inguinal hernia. Family History   Adopted: Yes     Social History     Tobacco Use    Smoking status: Never Smoker    Smokeless tobacco: Never Used   Substance Use Topics    Alcohol use:  Yes Alcohol/week: 0.0 standard drinks     Comment: socially        Review of Systems   HENT: Negative for sore throat. Musculoskeletal: Positive for back pain and neck pain. Neurological: Positive for headaches. Objective:   No flowsheet data found. [INSTRUCTIONS:  \"[x]\" Indicates a positive item  \"[]\" Indicates a negative item  -- DELETE ALL ITEMS NOT EXAMINED]    Constitutional: [x] Appears well-developed and well-nourished [x] No apparent distress      [] Abnormal -     Mental status: [x] Alert and awake  [x] Oriented to person/place/time [x] Able to follow commands    [] Abnormal -     Eyes:   EOM    [x]  Normal    [] Abnormal -   Sclera  [x]  Normal    [] Abnormal -          Discharge [x]  None visible   [] Abnormal -     HENT: [x] Normocephalic, atraumatic  [x] Abnormal - Large right tonsil  [x] Mouth/Throat: Mucous membranes are moist    External Ears [x] Normal  [] Abnormal -    Neck: [x] No visualized mass [] Abnormal -     Pulmonary/Chest: [x] Respiratory effort normal   [x] No visualized signs of difficulty breathing or respiratory distress        [] Abnormal -      Musculoskeletal:   [x] Normal gait with no signs of ataxia         [] Normal range of motion of neck        [] Abnormal - Limited ROM in neck when turning head to both sides. Neurological:        [x] No Facial Asymmetry (Cranial nerve 7 motor function) (limited exam due to video visit)          [x] No gaze palsy        [] Abnormal -          Skin:        [x] No significant exanthematous lesions or discoloration noted on facial skin         [] Abnormal -            Psychiatric:       [x] Normal Affect [] Abnormal -        [x] No Hallucinations      We discussed the expected course, resolution and complications of the diagnosis(es) in detail. Medication risks, benefits, costs, interactions, and alternatives were discussed as indicated.   I advised him to contact the office if his condition worsens, changes or fails to improve as anticipated. He expressed understanding with the diagnosis(es) and plan. Ross Solangecaroline, was evaluated through a synchronous (real-time) audio-video encounter. The patient (or guardian if applicable) is aware that this is a billable service. Verbal consent to proceed has been obtained within the past 12 months. The visit was conducted pursuant to the emergency declaration under the [address] [place] [address] authority and the [employer] General Act. Patient identification was verified, and a caregiver was present when appropriate. The patient was located in a state where the provider was credentialed to provide care.       Gordon Head, NP

## 2021-05-20 NOTE — PROGRESS NOTES
Mello Benjamin presents today for   Chief Complaint   Patient presents with    Follow-up     1 month    Headache     no improvement    Back Pain     pain level  4/10    Results     discuss lab results       Mello Benjamin preferred language for health care discussion is english/other. Is someone accompanying this pt? no    Is the patient using any DME equipment during [address]? no    Depression Screening:  3 most recent PHQ Screens 5/20/2021   Little interest or pleasure in doing things Not at all   Feeling down, depressed, irritable, or hopeless Not at all   Total Score PHQ 2 0       Learning Assessment:  Learning Assessment 4/21/2021   PRIMARY LEARNER Patient   HIGHEST LEVEL OF EDUCATION - PRIMARY LEARNER  4 YEARS OF COLLEGE   BARRIERS PRIMARY LEARNER NONE   CO-LEARNER CAREGIVER No   PRIMARY LANGUAGE ENGLISH   LEARNER PREFERENCE PRIMARY DEMONSTRATION   ANSWERED BY Patient   RELATIONSHIP SELF       Abuse Screening:  Abuse Screening Questionnaire 4/21/2021   Do you ever feel afraid of your partner? N   Are you in a relationship with someone who physically or mentally threatens you? N   Is it safe for you to go home? Y       Generalized Anxiety  No flowsheet data found. Health Maintenance Due   Topic Date Due    COVID-19 Vaccine (1) Never done    Shingrix Vaccine Age 50> (1 of 2) Never done   . Health Maintenance reviewed and discussed and ordered per Provider. Coordination of Care:  1. Have you been to the ER, urgent care clinic since your last visit? Hospitalized since your last visit? no    2. Have you seen or consulted any other health care providers outside of the [address] [place] [address] since your last visit? Include any pap smears or colon screening. Yes, neurosurgeon      Advance Directive:  1. Do you have an advance directive in place?  Patient Reply:no

## 2021-07-06 ENCOUNTER — HOSPITAL ENCOUNTER (OUTPATIENT)
Dept: PHYSICAL THERAPY | Age: 61
Discharge: HOME | End: 2021-07-06
Payer: COMMERCIAL

## 2021-07-06 PROCEDURE — 97162 PT EVAL MOD COMPLEX 30 MIN: CPT

## 2021-07-06 PROCEDURE — 97530 THERAPEUTIC ACTIVITIES: CPT

## 2021-07-06 PROCEDURE — 97110 THERAPEUTIC EXERCISES: CPT

## 2021-07-06 NOTE — PROGRESS NOTES
PT DAILY TREATMENT NOTE     Patient Name: Shilpa Alcaraz  Date:2021  : 1960  [x]  Patient  Verified  Payor: Esther 22 / Plan: [address] / Product Type: Managed Care Medicaid /    In time:108  Out time:202  Total Treatment Time (min): 47  Visit #: 1 of 12    Treatment Area: Neck pain [M54.2]    Physical Therapy Evaluation Cervical Spine    SUBJECTIVE    Any medication changes, allergies to medications, adverse drug reactions, diagnosis change, or new procedure performed?: [x] No    [] Yes (see summary sheet for update)    Subjective functional status/changes:     PLOF: Father, Workout Regularly, Regular Walking Regime, (I) Functional ADLs, (I) Self-Care ADLs, Work Full Time  Current Functional Status: Cessation of workout regime, Difficulty with functional lifting, Difficulty with household ADLs  Work Hx: [email or web address] - [employer]  Living Situation: Lives with family  Comorbidities: Pre-Diabetic, Osteoarthritis, Hx TIA (), Squamous Cell Carcinoma (2020 - Remission), Left Shoulder Sx (Prior to  - ACJ Reconstruction,  - RCR/Labral Repair)  Medications: Ibuprofen 800 mg (PRN)    Subjective: Patient presents with neck pain with associated migraine with onset 2020 with patient denying trauma/injury. Patient does report prior diagnosis of stenosis C2-T2. Patient reports presence of migraine ~1x/year but does report frequent daily headaches. With onset patient reports noting some visual changes with patient presenting to the ER where he underwent an MRI and x-ray of the chest, brain and back. Patient reports hospitalization 2020 secondary to dizziness with resultant fall. Patient reports constant right-sided temporal headaches. Patient as well reports some radiating pain in his left arm and left leg with some associated numbness and weakness of the left LE.  Patient reports previous benefit with manual cervical traction, performed by his wife, with receival of formal physical therapy x1 2005. Patient reports intermittent N/T along bilateral digits IV-V, intermittently (left>right). Patient denies difficulties with bowel dysfunction but does report increase in urinary urgency and frequency with patient currently under the care of a urologist with patient recently diagnosed with cysts along bilateral kidneys with questioned contribution to increase in urinary frequency. Patient denies falls nor imbalance. Patient does report worsening in visual acuity over the last year, denies changes in hearing nor spots in is vision. Patient denies chest pain nor shortness of breath. Patient reports his headaches are positional and occur when he moves a certain way. Patient reports reproduction of UE radicular symptoms with ipsilateral rotation bilateral rotation with reproduction of neck spasms with cervical extension AROM. Patient denies changes in  strength. Patient reports plan to receive UE EMG 7/7/2021. Pain Intensity (0-10, VAS): Current 5-6, Worst 10, Best 4    Patient Goals: \"Relief. \"    OBJECTIVE    BP: 128/74 mmHg    Shoulder/Scapular Screen: [x] WNL  (Left Shoulder non-specific restriction of AROM/PROM)    Cervical Active Movements: [] N/A   [] Too acute   [] Other:  ROM AROM Comments   Forward flexion 35 Bilateral cervical tightness   Extension 25 Apprehension with movement   SB right 25 Contralateral cervical tightness   SB left  25 Contralateral cervical tightness   Rotation right 45 Bilateral cervical tightness   Rotation left 45  Left UT \"electricity     Palpation: Midline upper cervical tenderness    Neurological Screen (myotome/dermatome/reflexes): [] WNL   Light Touch Sensation: Intact and symmetrical bilaterally C4-T1 to light touch   Deep Tendon Reflexes (C5-C7): 0+ L Biceps, 2+ R Biceps, 0+ L/R Brachioradialis, 0+ L/R Triceps   Myotomal Strength (C4-T1):  Intact and symmetrical bilaterally C4-T1   Comments: No thenar eminence atrophy, No atrophy noted to supra/infrascapular fossa    Upper Limb Tension Tests: [] N/A       Ulnar:  (+) Left, (-) Right       Median: (+) Left, (+) Right [Left > Right]       Radial: (-) Left, (+) Right    Cervical Special Tests:       Spurling's: (-) Left, (-) Right          Compression: (-) [Reproduction of central cervical pain without radiation]         Transverse Ligament: (-)        Alar Ligament: Normal end-feel bilaterally    Joint Mobility Assessment   Cervical: Without symptom reproduction with performance C2-C7    OBJECTIVE     38 min [x]Eval                  []Re-Eval     8 min Therapeutic Exercise:  [x] See flow sheet : Patient educated regarding completion of prescribed HEP and provided with written HEP instructions, Patient educated regarding diagnosis and PT POC   Rationale: increase ROM and increase strength to improve the patients ability to improve ease with functional ADLs    8 min Therapeutic Activity:  [x]  See flow sheet: Patient educated regarding monitoring of neurological s/s with patient educated regarding importance of immediate physician contact if symptoms occur, Patient educated regarding positional modifications to reduce symptom presentation, Education regarding use of manual cervical distraction for symptom alleviation   Rationale: increase ROM and increase strength  to improve the patients ability to improve ease with return to PLOF       With   [] TE   [] TA   [] neuro   [] other: Patient Education: [x] Review HEP    [] Progressed/Changed HEP based on:   [] positioning   [] body mechanics   [] transfers   [] heat/ice application    [] other:      Other Objective/Functional Measures: See objective above. Pain Level (0-10 scale) post treatment: 5-6    ASSESSMENT/Changes in Function: Patient presents with s/s consistent with bilateral cervical radicular pain with patient objectively demonstrating neurological screen WNL.  With cervical MRI consistent with moderate and severe multi-segment spinal stenosis bilaterally. Upper cervical stability WNL. With (+) ULTT, median nerve bias, bilaterally (left>right) with (-) cervical compression test with reproduction of centralized cervical symptoms without radicular symptoms. With positive benefit with manual cervical traction with relief of centralized cervical pain. To emphasize improvements in cervical segmental and myofascial mobility to improve available pain-free cervical AROM with completion of concomitant strengthening of the deep cervical and scapulothoracic musculature to improve gravity-resisted cervicothoracic spinal stability to improve patient ease with return to previous level of high functioning. Therapist with belief that patient would benefit from prescription of home mechanical traction unit, secondary to subjective reporting, with planned trialed use within clinic to determine efficacy and appropriateness of prescription. Patient will continue to benefit from skilled PT services to modify and progress therapeutic interventions, address functional mobility deficits, address ROM deficits, address strength deficits, analyze and address soft tissue restrictions, analyze and cue movement patterns, analyze and modify body mechanics/ergonomics and assess and modify postural abnormalities to attain remaining goals. [x]  See Plan of Care  []  See progress note/recertification  []  See Discharge Summary         Progress towards goals / Updated goals:    Short Term Goals: To be accomplished in 3 weeks:  1. Patient will subjectively report full compliance with prescribed HEP. Eval: HEP provided  2. Patient will demonstrate cervical left/right rotation AROM >/= 60 degrees to improve safety with driving. Eval: Left Cervical Rotation AROM = 45 deg / Right Cervical Rotation AROM = 45 deg  3. Patient will demonstrate cervical extension AROM >/= 40 degrees to improve ease with overhead reach.   Eval: Cervical Extension AROM = 25 deg         Long Term Goals: To be accomplished in 6 weeks:  1. Patient will demonstrate a significant functional improvement as demonstrated by a score of >/= 55 on FOTO. Eval: FOTO = 44       2. Patient will demonstrate (-) left/right ULTT, median nerve bias, to improve ease with return to workout regime. Eval: (+) Left ULTT, median nerve bias / (+) Right ULTT, median nerve bias  3. Patient will subjectively report >/= 60% improvement in symptoms to improve overall quality of life.   Eval: 0%      PLAN  [x]  Upgrade activities as tolerated     []  Continue plan of care  []  Update interventions per flow sheet       []  Discharge due to:_  []  Other:_      Anastasia Chen, PT 7/6/2021  7:48 AM    Future Appointments   Date Time Provider Orin Lutz   7/6/2021  1:15 PM Gabriel Severino [address] [facility]   7/12/2021  3:00 PM Jimmy Nava NP [facility]   11/15/2021  8:00 AM JON CoombsP BS AMB

## 2021-07-06 NOTE — PROGRESS NOTES
[facility] - [facility]              [address]        [place], [address]   [phone] fax    Plan of Care/ Statement of Necessity for Physical Therapy Services  Patient name: Kashif Garza Start of Care: 2021   Referral source: Lauro Sykes MD : 1960    Medical Diagnosis: Neck pain [M54.2]  Payor: [place] [id number] / Plan: [address] / Product Type: Managed Care Medicaid /  Onset Date:2020    Treatment Diagnosis: Bilateral Cervical Radicular Pain   Prior Hospitalization: see medical history Provider#: [id number]   Medications: Verified on Patient summary List    Comorbidities: Pre-Diabetic, Osteoarthritis, Hx TIA (), Squamous Cell Carcinoma (2020 - Remission), Left Shoulder Sx (Prior to  - ACJ Reconstruction,  - RCR/Labral Repair)   Prior Level of Function: Father, Workout Regularly, Regular Walking Regime, (I) Functional ADLs, (I) Self-Care ADLs, Work Full Time     The Plan of Care and following information is based on the information from the initial evaluation. Assessment:    Patient presents with s/s consistent with bilateral cervical radicular pain with patient objectively demonstrating neurological screen WNL. With cervical MRI consistent with moderate and severe multi-segment spinal stenosis bilaterally. Upper cervical stability WNL. With (+) ULTT, median nerve bias, bilaterally (left>right) with (-) cervical compression test with reproduction of centralized cervical symptoms without radicular symptoms. With positive benefit with manual cervical traction with relief of centralized cervical pain.  To emphasize improvements in cervical segmental and myofascial mobility to improve available pain-free cervical AROM with completion of concomitant strengthening of the deep cervical and scapulothoracic musculature to improve gravity-resisted cervicothoracic spinal stability to improve patient ease with return to previous level of high functioning. Therapist with belief that patient would benefit from prescription of home mechanical traction unit, secondary to subjective reporting, with planned trialed use within clinic to determine efficacy and appropriateness of prescription.     Patient will continue to benefit from skilled PT services to modify and progress therapeutic interventions, address functional mobility deficits, address ROM deficits, address strength deficits, analyze and address soft tissue restrictions, analyze and cue movement patterns, analyze and modify body mechanics/ergonomics and assess and modify postural abnormalities to attain remaining goals. Key Information:    BP: 128/74 mmHg     Shoulder/Scapular Screen: [x]? WNL  (Left Shoulder non-specific restriction of AROM/PROM)     Cervical Active Movements: []? N/A   []? Too acute   []? Other:  ROM AROM Comments   Forward flexion 35 Bilateral cervical tightness   Extension 25 Apprehension with movement   SB right 25 Contralateral cervical tightness   SB left  25 Contralateral cervical tightness   Rotation right 45 Bilateral cervical tightness   Rotation left 45  Left UT \"electricity      Neurological Screen (myotome/dermatome/reflexes): []? WNL              Light Touch Sensation: Intact and symmetrical bilaterally C4-T1 to light touch              Deep Tendon Reflexes (C5-C7): 0+ L Biceps, 2+ R Biceps, 0+ L/R Brachioradialis, 0+ L/R Triceps              Myotomal Strength (C4-T1):  Intact and symmetrical bilaterally C4-T1              Comments: No thenar eminence atrophy, No atrophy noted to supra/infrascapular fossa     Upper Limb Tension Tests: []? N/A       Ulnar:  (+) Left, (-) Right       Median: (+) Left, (+) Right [Left > Right]       Radial: (-) Left, (+) Right     Cervical Special Tests:       Spurling's: (-) Left, (-) Right                                          Compression: (-) [Reproduction of central cervical pain without radiation] Transverse Ligament: (-)        Alar Ligament: Normal end-feel bilaterally    Evaluation Complexity History MEDIUM  Complexity : 1-2 comorbidities / personal factors will impact the outcome/ POC ; Examination MEDIUM Complexity : 3 Standardized tests and measures addressing body structure, function, activity limitation and / or participation in recreation  ;Presentation MEDIUM Complexity : Evolving with changing characteristics  ; Clinical Decision Making MEDIUM Complexity : FOTO score of 26-74  Overall Complexity Rating: MEDIUM  Problem List: pain affecting function, decrease ROM, decrease strength, decrease ADL/ functional abilitiies, decrease activity tolerance and decrease flexibility/ joint mobility   Treatment Plan may include any combination of the following: Therapeutic exercise, Therapeutic activities, Neuromuscular re-education, Physical agent/modality, Manual therapy, Patient education, Self Care training, Functional mobility training and Home safety training  Patient / Family readiness to learn indicated by: asking questions, trying to perform skills and interest  Persons(s) to be included in education: patient (P)  Barriers to Learning/Limitations: None  Patient Goal (s): Relief  Patient Self Reported Health Status: good  Rehabilitation Potential: good    Short Term Goals: To be accomplished in 3 weeks:  1. Patient will subjectively report full compliance with prescribed HEP. Eval: HEP provided  2. Patient will demonstrate cervical left/right rotation AROM >/= 60 degrees to improve safety with driving. Eval: Left Cervical Rotation AROM = 45 deg / Right Cervical Rotation AROM = 45 deg  3. Patient will demonstrate cervical extension AROM >/= 40 degrees to improve ease with overhead reach. Eval: Cervical Extension AROM = 25 deg         Long Term Goals: To be accomplished in 6 weeks:  1.  Patient will demonstrate a significant functional improvement as demonstrated by a score of >/= 55 on FOTO. Eval: FOTO = 44       2. Patient will demonstrate (-) left/right ULTT, median nerve bias, to improve ease with return to workout regime. Eval: (+) Left ULTT, median nerve bias / (+) Right ULTT, median nerve bias  3. Patient will subjectively report >/= 60% improvement in symptoms to improve overall quality of life. Eval: 0%    Frequency / Duration: Patient to be seen 2 times per week for 6 weeks. Patient/ Caregiver education and instruction: Diagnosis, prognosis, self care, activity modification and exercises   [x]  Plan of care has been reviewed with BROOKE Otoole, PT 7/6/2021 7:49 AM  ________________________________________________________________________    I certify that the above Therapy Services are being furnished while the patient is under my care. I agree with the treatment plan and certify that this therapy is necessary.     [de-identified] Signature:____________Date:_________TIME:________     Rich Plascencia MD  ** Signature, Date and Time must be completed for valid certification **  Please sign and return to [facility] - [address], [address]   [phone] fax

## 2021-07-12 PROBLEM — C09.9 TONSIL CANCER (HCC): Status: ACTIVE | Noted: 2021-05-26

## 2021-07-20 ENCOUNTER — PATIENT MESSAGE (OUTPATIENT)
Dept: FAMILY MEDICINE CLINIC | Age: 61
End: 2021-07-20

## 2021-07-21 ENCOUNTER — TELEPHONE (OUTPATIENT)
Dept: FAMILY MEDICINE CLINIC | Age: 61
End: 2021-07-21

## 2021-07-21 NOTE — TELEPHONE ENCOUNTER
----- Message from Sarah Ayala NP sent at 7/14/2021 10:31 AM EDT -----  Regarding: FW: Test Results Question  Contact: [phone]  Merlin Heritacarlos  I am not sure who ordered this PET scan. I have not seen any results. Can you ask patient to make a virtual appointment with me - next available so we can discuss and I can get the information needed to refer him. LEA Jackson, FNP-C  ----- Message -----  From: Rogelio Keller  Sent: 7/9/2021  10:52 AM EDT  To: Sarah Ayala NP  Subject: FW: Test Results Question                          ----- Message -----  From: Deana Jefferson  Sent: 6/29/2021  10:24 AM EDT  To: [facility] Nurse Pool  Subject: Test Results Question                            Dr Lurdes Hooks. The PET scan revealed a 12mm hepatic cyst.  Can you five me a referral to a Doctor to talk to about treatment for that? Or make an appointment to see you. The Docs at [facility] said youll likely want to drain it.       Thank you

## 2021-07-21 NOTE — TELEPHONE ENCOUNTER
From: Leonardo Dial  To: Ruth Cortez NP  Sent: 7/20/2021 11:02 AM EDT  Subject: Test Results Question    Hello. I wrote back on June 29 about a hepatic cyst found in my liver during a PET scan but never received a response. I was told by my Oncologist it needed to be checked and possibly treated. Can you advise how I should proceed please.

## 2021-07-26 NOTE — TELEPHONE ENCOUNTER
Patient returned call and stated a Dr Aaliyah Blanco from [facility] ordered PET scan, he was dx with throat cancer.  Dr Aaliyah Blanco advised him to f/u with PCP so that liver cyst could be further evaluated

## 2021-07-28 ENCOUNTER — HOSPITAL ENCOUNTER (OUTPATIENT)
Dept: PHYSICAL THERAPY | Age: 61
Discharge: HOME | End: 2021-07-28
Payer: COMMERCIAL

## 2021-07-28 PROCEDURE — 97110 THERAPEUTIC EXERCISES: CPT

## 2021-07-28 PROCEDURE — 97112 NEUROMUSCULAR REEDUCATION: CPT

## 2021-07-28 PROCEDURE — 97012 MECHANICAL TRACTION THERAPY: CPT

## 2021-07-28 NOTE — PROGRESS NOTES
PT DAILY TREATMENT NOTE     Patient Name: Jens Aly  Date:2021  : 1960  [x]  Patient  Verified  Payor: Esther 22 / Plan: [address] / Product Type: Managed Care Medicaid /    In time:2:40  Out time:3:25  Total Treatment Time (min): 45  Visit #: 2 of 12      Treatment Area: Neck pain [M54.2]    SUBJECTIVE  Pain Level (0-10 scale): 6  Any medication changes, allergies to medications, adverse drug reactions, diagnosis change, or new procedure performed?: [x] No    [] Yes (see summary sheet for update)  Subjective functional status/changes:   [] No changes reported  Patient reports he has been having a headache for the past two days. Patient reports he woke up on  and started to have radicular pain along left arm. OBJECTIVE    Modality rationale: decrease pain to improve the patients ability to decrease difficulty while performing tasks. Min Type Additional Details   10 [x]  Traction: [x] Cervical       []Lumbar                       [] Prone          [x]Supine                       []Intermittent   [x]Continuous Lbs:16#  [] before manual  [] after manual   [] Skin assessment post-treatment:  []intact []redness- no adverse reaction    []redness - adverse reaction:       23 min Therapeutic Exercise:  [x] See flow sheet :   Rationale: increase ROM and increase strength to improve the patients ability to increase tolerance to activities. 12 min Neuromuscular Re-education:  [x]  See flow sheet :empahsis on cervical mobility and stability. Rationale: increase ROM, increase strength, improve coordination, improve balance and increase proprioception  to improve the patients ability to increase ease with overhead reaching.            With   [] TE   [] TA   [] neuro   [] other: Patient Education: [x] Review HEP    [] Progressed/Changed HEP based on:   [] positioning   [] body mechanics   [] transfers   [] heat/ice application    [] other:      Other Objective/Functional Measures: patient reports performing HEP. Pain Level (0-10 scale) post treatment: 3-4    ASSESSMENT/Changes in Function: Initiated exercises per POC. Patient states after performing Ulnar nerve glide having increase in pressure/headache on right side of his head. Patient will continue to benefit from skilled PT services to modify and progress therapeutic interventions, address functional mobility deficits, address ROM deficits, address strength deficits and analyze and address soft tissue restrictions to attain remaining goals. []  See Plan of Care  []  See progress note/recertification  []  See Discharge Summary         Progress towards goals / Updated goals:  Short Term Goals: To be accomplished in 3 weeks:  1. Patient will subjectively report full compliance with prescribed HEP. Eval: HEP provided  Current: MET: pt reports performing HEP. 7/28/21  2. Patient will demonstrate cervical left/right rotation AROM >/= 60 degrees to improve safety with driving. Eval: Left Cervical Rotation AROM = 45 deg / Right Cervical Rotation AROM = 45 deg  3. Patient will demonstrate cervical extension AROM >/= 40 degrees to improve ease with overhead reach. Eval: Cervical Extension AROM = 25 deg         Long Term Goals: To be accomplished in 6 weeks:  1. Patient will demonstrate a significant functional improvement as demonstrated by a score of >/= 55 on FOTO. Eval: FOTO = 44       2. Patient will demonstrate (-) left/right ULTT, median nerve bias, to improve ease with return to workout regime. Eval: (+) Left ULTT, median nerve bias / (+) Right ULTT, median nerve bias  3. Patient will subjectively report >/= 60% improvement in symptoms to improve overall quality of life.   Eval: 0%       PLAN  []  Upgrade activities as tolerated     [x]  Continue plan of care  []  Update interventions per flow sheet       []  Discharge due to:_  []  Other:_      Adriana Rasheed, PTA 7/28/2021  2:43 PM    Future Appointments   Date Time Provider Orin Nelda   7/28/2021  2:45 PM Juli Zaragoza, [place] [facility]   7/30/2021  2:45 PM Juli Zaragoza, [place] [facility]   8/2/2021  3:30 PM Yue Chacon PT [facility]   8/4/2021  2:45 PM Morris Mcleod [facility]   10/15/2021  1:00 PM Cate Gomez NP [facility]   11/15/2021  8:00 AM Gustavo Huynh NP [facility] BS AMB

## 2021-08-02 ENCOUNTER — HOSPITAL ENCOUNTER (OUTPATIENT)
Dept: PHYSICAL THERAPY | Age: 61
End: 2021-08-02
Payer: COMMERCIAL

## 2021-08-03 NOTE — TELEPHONE ENCOUNTER
Patient was seen by a Dr Judi Jauregui at [facility]. Talked with staff Lavinia Argueta) who stated she will be faxing notes to our office.

## 2021-08-04 ENCOUNTER — HOSPITAL ENCOUNTER (OUTPATIENT)
Dept: PHYSICAL THERAPY | Age: 61
Discharge: HOME | End: 2021-08-04
Payer: COMMERCIAL

## 2021-08-04 ENCOUNTER — APPOINTMENT (OUTPATIENT)
Dept: PHYSICAL THERAPY | Age: 61
End: 2021-08-04
Payer: COMMERCIAL

## 2021-08-04 PROCEDURE — 97112 NEUROMUSCULAR REEDUCATION: CPT

## 2021-08-04 PROCEDURE — 97110 THERAPEUTIC EXERCISES: CPT

## 2021-08-04 PROCEDURE — 97530 THERAPEUTIC ACTIVITIES: CPT

## 2021-08-04 PROCEDURE — 97012 MECHANICAL TRACTION THERAPY: CPT

## 2021-08-04 NOTE — PROGRESS NOTES
PT DAILY TREATMENT NOTE     Patient Name: Jens Aly  Date:2021  : 1960  [x]  Patient  Verified  Payor: [employer] [employer] / Plan: [address] / Product Type: Managed Care Medicaid /    In time:242  Out time:327  Total Treatment Time (min): 45  Visit #: 3 of 12    Treatment Area: Neck pain [M54.2]    SUBJECTIVE  Pain Level (0-10 scale): 5-6  Any medication changes, allergies to medications, adverse drug reactions, diagnosis change, or new procedure performed?: [x] No    [] Yes (see summary sheet for update)  Subjective functional status/changes:   [] No changes reported  Patient reports some generalized soreness this afternoon with patient reporting increased activity demands over the last two sessions. Patient reports an hour after last treatment session noting severe worsening of headache which persisted throughout the rest of the day.      OBJECTIVE    Modality rationale: decrease pain and increase tissue extensibility to improve the patients ability to improve ease with work-related ADLs   Min Type Additional Details   10 [x]  Traction: [x] Cervical       []Lumbar           [x]Supine   [x]Intermittent  60 sec/20 sec Lbs: Max 16 lb / Reola Manus 8 lb       17 min Therapeutic Exercise:  [x] See flow sheet : Emphasis placed on improving available cervicothoracic and upper quarter AROM and strength   Rationale: increase ROM and increase strength to improve the patients ability to improve ease with household ADLs    8 min Therapeutic Activity:  [x]  See flow sheet : Emphasis placed on improving available cervical and upper quarter AROM to improve ease with household ADLS, functional lifting, recreational exercise   Rationale: increase ROM and increase strength  to improve the patients ability to improve ease with recreation     10 min Neuromuscular Re-education:  [x]  See flow sheet : Emphasis placed on improving activation and recruitment of the deep cervical and scapulothoracic musculature and improve spinal kinesthetic awareness   Rationale: increase ROM, increase strength and increase proprioception  to improve the patients ability to improve ease with exercise regime. With   [] TE   [] TA   [] neuro   [] other: Patient Education: [x] Review HEP    [] Progressed/Changed HEP based on:   [] positioning   [] body mechanics   [] transfers   [] heat/ice application    [] other:      Other Objective/Functional Measures: See goals below. *Trial of intermittent cervical mechanical traction with therapist questioning provocation of symptoms secondary to intensification of headache after last treatment session. Kept all other variables the same to allow for accurate assessment of treatment. Pain Level (0-10 scale) post treatment: 0    ASSESSMENT/Changes in Function: Patient with limited progression towards created therapeutic goals with patient with limited attendance, with attendance to only 2 scheduled appointments since start of care 7/6/2021, thus limiting extent of progression and ability to determine overall efficacy of treatment approach. To continue per plan of care with continued trial of mechanical cervical traction to determine efficacy with modification in accordance with treatment response. Patient will continue to benefit from skilled PT services to modify and progress therapeutic interventions, address functional mobility deficits, address ROM deficits, address strength deficits, analyze and address soft tissue restrictions, analyze and cue movement patterns, analyze and modify body mechanics/ergonomics, assess and modify postural abnormalities and instruct in home and community integration to attain remaining goals. []  See Plan of Care  [x]  See progress note/recertification  []  See Discharge Summary         Progress towards goals / Updated goals:    Short Term Goals: To be accomplished in 3 weeks:  1.  Patient will subjectively report full compliance with prescribed HEP. Eval: HEP provided  Current: Met, Patient reports performing HEP. 7/28/21  2. Patient will demonstrate cervical left/right rotation AROM >/= 60 degrees to improve safety with driving. Eval: Left Cervical Rotation AROM = 45 deg / Right Cervical Rotation AROM = 45 deg  Current: Remains, Left Cervical Rotation AROM = 45 deg / Right Cervical Rotation AROM = 45 deg, 8/4/2021  3. Patient will demonstrate cervical extension AROM >/= 40 degrees to improve ease with overhead reach. Eval: Cervical Extension AROM = 25 deg  Current: Progressing, Cervical Extension AROM = 30 deg, 8/4/2021         Long Term Goals: To be accomplished in 6 weeks:  1. Patient will demonstrate a significant functional improvement as demonstrated by a score of >/= 55 on FOTO. Eval: FOTO = 44        Current: Progressing, FOTO = 52, 8/4/2021  2. Patient will demonstrate (-) left/right ULTT, median nerve bias, to improve ease with return to workout regime. Eval: (+) Left ULTT, median nerve bias / (+) Right ULTT, median nerve bias  Current: (+) Left ULTT, median nerve bias / (+) Right ULTT, median nerve bias, 8/4/2021  3. Patient will subjectively report >/= 60% improvement in symptoms to improve overall quality of life.   Eval: 0%  Current: 0% improvement in symptoms since start of care, 8/4/2021    PLAN  [x]  Upgrade activities as tolerated     [x]  Continue plan of care  []  Update interventions per flow sheet       []  Discharge due to:_  []  Other:_      Anabel Muro, PT 8/4/2021  9:13 AM    Future Appointments   Date Time Provider Orin Lutz   8/4/2021  2:45 PM [place]col, [address] [facility]   10/15/2021  1:00 PM Vicki Martinez NP [address] [facility]   11/15/2021  8:00 AM Jamir Pabon NP [facility]

## 2021-08-17 ENCOUNTER — APPOINTMENT (OUTPATIENT)
Dept: PHYSICAL THERAPY | Age: 61
End: 2021-08-17
Payer: COMMERCIAL

## 2021-08-19 ENCOUNTER — APPOINTMENT (OUTPATIENT)
Dept: PHYSICAL THERAPY | Age: 61
End: 2021-08-19
Payer: COMMERCIAL

## 2021-08-25 NOTE — PROGRESS NOTES
[facility]              [address] [place], [address]   [phone] fax    Discharge Summary  Patient name: Kimberly Ordoñez Start of Care: 2021   Referral source: Christa Ibanez MD : 1960   Medical/Treatment Diagnosis: Neck pain [M54.2]  [facility] / Plan: [address] / Product Type: Managed Care Medicaid /  Onset Date:2020     Prior Hospitalization: see medical history Provider#: [id number]   Medications: Verified on Patient Summary List    Comorbidities: Pre-Diabetic, Osteoarthritis, Hx TIA (), Squamous Cell Carcinoma (2020 - Remission), Left Shoulder Sx (Prior to  - ACJ Reconstruction,  - RCR/Labral Repair)   Prior Level of Function: Father, Workout Regularly, Regular Walking Regime, (I) Functional ADLs, (I) Self-Care ADLs, Work Full Time  Visits from Start of Care: 3    Missed Visits: 5  Reporting Period : 2021 to 2021    Summary of Care:    Short Term Goals: To be accomplished in 3 weeks:  1. Patient will subjectively report full compliance with prescribed HEP. Eval: HEP provided  Current: Met, Patient reports performing HEP  2. Patient will demonstrate cervical left/right rotation AROM >/= 60 degrees to improve safety with driving. Eval: Left Cervical Rotation AROM = 45 deg / Right Cervical Rotation AROM = 45 deg  Current: Remains, Left Cervical Rotation AROM = 45 deg / Right Cervical Rotation AROM = 45 deg  3. Patient will demonstrate cervical extension AROM >/= 40 degrees to improve ease with overhead reach. Eval: Cervical Extension AROM = 25 deg  Current: Progressing, Cervical Extension AROM = 30 deg         Long Term Goals: To be accomplished in 6 weeks:  1. Patient will demonstrate a significant functional improvement as demonstrated by a score of >/= 55 on FOTO. Eval: FOTO = 44        Current: Progressing, FOTO = 52  2.  Patient will demonstrate (-) left/right ULTT, median nerve bias, to improve ease with return to workout regime. Eval: (+) Left ULTT, median nerve bias / (+) Right ULTT, median nerve bias  Current: Remains, (+) Left ULTT, median nerve bias / (+) Right ULTT, median nerve bias  3. Patient will subjectively report >/= 60% improvement in symptoms to improve overall quality of life. Eval: 0%  Current: Remains, 0% improvement in symptoms since start of care    ASSESSMENT/RECOMMENDATIONS:    At this time patient to be discharged in accordance with clinic no-show/cancellation policy with patient with poor and limited attendance over course of care thus limiting extent of progression towards created therapeutic goals with inability to reliably assess treatment efficacy secondary to patient non-compliance with treatment. Voicemail left with patient 8/25/2021 to inform patient of clinic discharge in accordance with no-show/cancellation policy. [x]Discontinue therapy: []Patient has reached or is progressing toward set goals      [x]Patient is non-compliant or has abdicated      []Due to lack of appreciable progress towards set goals    Jonnathan Galindo, PT 8/25/2021 3:37 PM    NOTE TO PHYSICIAN:  Please complete the following and fax to: [facility] at [facility] at [phone]  . Retain this original for your records. If you are unable to process this request in   24 hours, please contact our office.      [] I have read the above report and request that my patient continue therapy with the following changes/special instructions:  [] I have read the above report and request that my patient be discharged from therapy    Physician's Signature:____________Date:_________TIME:________     Alexys Pineda MD  ** Signature, Date and Time must be completed for valid certification **

## 2021-09-01 ENCOUNTER — TELEPHONE (OUTPATIENT)
Dept: FAMILY MEDICINE CLINIC | Age: 61
End: 2021-09-01

## 2021-09-01 NOTE — TELEPHONE ENCOUNTER
Call to patient to follow up regarding PET scan and to find out if he has seen oncology or another provider. Message left with no information given.  Requested a return call back to the office and he should speak with Mario Harley DNP, SHONAP-C

## 2021-12-21 ENCOUNTER — OFFICE VISIT (OUTPATIENT)
Dept: FAMILY MEDICINE CLINIC | Age: 61
End: 2021-12-21
Payer: COMMERCIAL

## 2021-12-21 ENCOUNTER — HOSPITAL ENCOUNTER (OUTPATIENT)
Dept: LAB | Age: 61
Discharge: HOME | End: 2021-12-21
Payer: COMMERCIAL

## 2021-12-21 VITALS
TEMPERATURE: 98.4 F | HEART RATE: 58 BPM | BODY MASS INDEX: 27.52 KG/M2 | HEIGHT: 71 IN | RESPIRATION RATE: 18 BRPM | OXYGEN SATURATION: 100 % | WEIGHT: 196.6 LBS | SYSTOLIC BLOOD PRESSURE: 110 MMHG | DIASTOLIC BLOOD PRESSURE: 75 MMHG

## 2021-12-21 DIAGNOSIS — E55.9 VITAMIN D DEFICIENCY: ICD-10-CM

## 2021-12-21 DIAGNOSIS — R42 DIZZINESS: ICD-10-CM

## 2021-12-21 DIAGNOSIS — Z78.9 VEGAN DIET: Primary | ICD-10-CM

## 2021-12-21 DIAGNOSIS — Z78.9 VEGAN DIET: ICD-10-CM

## 2021-12-21 LAB
25(OH)D3 SERPL-MCNC: 33.7 NG/ML (ref 30–100)
ALBUMIN SERPL-MCNC: 3.9 G/DL (ref 3.4–5)
ALBUMIN/GLOB SERPL: 1.2 {RATIO} (ref 0.8–1.7)
ALP SERPL-CCNC: 45 U/L (ref 45–117)
ALT SERPL-CCNC: 28 U/L (ref 16–61)
ANION GAP SERPL CALC-SCNC: 3 MMOL/L (ref 3–18)
AST SERPL-CCNC: 17 U/L (ref 10–38)
BILIRUB SERPL-MCNC: 1 MG/DL (ref 0.2–1)
BUN SERPL-MCNC: 10 MG/DL (ref 7–18)
BUN/CREAT SERPL: 13 (ref 12–20)
CALCIUM SERPL-MCNC: 8.9 MG/DL (ref 8.5–10.1)
CHLORIDE SERPL-SCNC: 104 MMOL/L (ref 100–111)
CHOLEST SERPL-MCNC: 229 MG/DL
CO2 SERPL-SCNC: 31 MMOL/L (ref 21–32)
CREAT SERPL-MCNC: 0.77 MG/DL (ref 0.6–1.3)
FOLATE SERPL-MCNC: >20 NG/ML (ref 3.1–17.5)
GLOBULIN SER CALC-MCNC: 3.3 G/DL (ref 2–4)
GLUCOSE SERPL-MCNC: 93 MG/DL (ref 74–99)
HDLC SERPL-MCNC: 70 MG/DL (ref 40–60)
HDLC SERPL: 3.3 {RATIO} (ref 0–5)
IRON SATN MFR SERPL: 30 % (ref 20–50)
IRON SERPL-MCNC: 97 UG/DL (ref 50–175)
LDLC SERPL CALC-MCNC: 137.6 MG/DL (ref 0–100)
LIPID PROFILE,FLP: ABNORMAL
POTASSIUM SERPL-SCNC: 4.2 MMOL/L (ref 3.5–5.5)
PROT SERPL-MCNC: 7.2 G/DL (ref 6.4–8.2)
SODIUM SERPL-SCNC: 138 MMOL/L (ref 136–145)
TIBC SERPL-MCNC: 319 UG/DL (ref 250–450)
TRIGL SERPL-MCNC: 107 MG/DL (ref ?–150)
VIT B12 SERPL-MCNC: 572 PG/ML (ref 211–911)
VLDLC SERPL CALC-MCNC: 21.4 MG/DL

## 2021-12-21 PROCEDURE — 36415 COLL VENOUS BLD VENIPUNCTURE: CPT

## 2021-12-21 PROCEDURE — 82607 VITAMIN B-12: CPT

## 2021-12-21 PROCEDURE — 82306 VITAMIN D 25 HYDROXY: CPT

## 2021-12-21 PROCEDURE — 80053 COMPREHEN METABOLIC PANEL: CPT

## 2021-12-21 PROCEDURE — 84425 ASSAY OF VITAMIN B-1: CPT

## 2021-12-21 PROCEDURE — 80061 LIPID PANEL: CPT

## 2021-12-21 PROCEDURE — 99214 OFFICE O/P EST MOD 30 MIN: CPT | Performed by: FAMILY MEDICINE

## 2021-12-21 PROCEDURE — 83540 ASSAY OF IRON: CPT

## 2021-12-21 RX ORDER — LANOLIN ALCOHOL/MO/W.PET/CERES
1 CREAM (GRAM) TOPICAL
COMMUNITY
End: 2021-12-21

## 2021-12-21 RX ORDER — CALCIUM CARBONATE/VITAMIN D3 600 MG-125
TABLET ORAL
COMMUNITY

## 2021-12-21 RX ORDER — ASCORBIC ACID 500 MG
TABLET ORAL
COMMUNITY

## 2021-12-21 NOTE — PROGRESS NOTES
Adriannadaniella Helms presents today for   Chief Complaint   Patient presents with    Dizziness     patient complains of dizziness , and faint feeling while standing , patient has changed diet to no sugar no caffine and vegan . states he has been getting dizzy for aprox. 1 month . take multiple organic suplements including organic Kyr[place]z neem , green blackwalnut gaston , quassia , glandular lamb pancreas tissue ,cloves , and worm wood . Adrianna Helms preferred language for health care discussion is english/other. Is someone accompanying this pt?no     Is the patient using any DME equipment during OV? No     Depression Screening:  3 most recent PHQ Screens 5/20/2021   Little interest or pleasure in doing things Not at all   Feeling down, depressed, irritable, or hopeless Not at all   Total Score PHQ 2 0       Learning Assessment:  Learning Assessment 4/21/2021   PRIMARY LEARNER Patient   HIGHEST LEVEL OF EDUCATION - PRIMARY LEARNER  4 YEARS OF COLLEGE   BARRIERS PRIMARY LEARNER NONE   CO-LEARNER CAREGIVER No   PRIMARY LANGUAGE ENGLISH   LEARNER PREFERENCE PRIMARY DEMONSTRATION   ANSWERED BY Patient   RELATIONSHIP SELF       Abuse Screening:  Abuse Screening Questionnaire 4/21/2021   Do you ever feel afraid of your partner? N   Are you in a relationship with someone who physically or mentally threatens you? N   Is it safe for you to go home? Y       Generalized Anxiety  No flowsheet data found. Health Maintenance Due   Topic Date Due    COVID-19 Vaccine (1) Never done    Shingrix Vaccine Age 50> (1 of 2) Never done    Flu Vaccine (1) Never done   . Health Maintenance reviewed and discussed and ordered per Provider. Adrianna Helms is updated on all     Coordination of Care:  1. Have you been to the ER, urgent care clinic since your last visit? Hospitalized since your last visit?  No.     2. Have you seen or consulted any other health care providers outside of the [address] [place] [address] since your last visit? Include any pap smears or colon screening. no

## 2021-12-21 NOTE — PROGRESS NOTES
Chief Complaint   Patient presents with    Dizziness     patient complains of dizziness , and faint feeling while standing , patient has changed diet to no sugar no caffine and vegan . states he has been getting dizzy for aprox. 1 month . take multiple organic suplements including organic  neem , green blackwalnut gaston , quassia , glandular lamb pancreas tissue ,cloves , and worm wood . Subjective  Jeffrey Patricia is a 64 y.o. male. HPI   Pt has hx of tonsillar cancer. He had an excision 6/2021. He had a recurrence in his nodes and went back for a lymph node dissection 10/2021. He is having scans every 3 months. He is being followed by Dr. Davina Galicia (med onc). Pt does need to set up that appt. Pt declined radiation and chemotherapy. Pt has made many lifestyle changes to maintain his health. He has eliminated sugar and caffeine from his diet. He follows a largely plant based diet. He was having smoothies that he made himself for about 5 wks. He would sautee some vegetables as well. He has had a little meat since Thanksgiving. He feels really good overall. However, pt has been having dizziness. He is taking many supplements. When he stands up, he will have a brief tunnel vision and dizziness. He does not have palpitations. Review of Systems   Constitutional: Negative. HENT: Negative. Respiratory: Negative. Cardiovascular: Negative. Negative for chest pain and palpitations. Neurological: Positive for dizziness. All other systems reviewed and are negative. Objective  Physical Exam  Vitals and nursing note reviewed. Constitutional:       General: He is not in acute distress. Appearance: Normal appearance. HENT:      Head: Normocephalic and atraumatic. Right Ear: External ear normal.      Left Ear: External ear normal.      Nose: Nose normal.   Eyes:      Extraocular Movements: Extraocular movements intact.       Conjunctiva/sclera: Conjunctivae normal. Cardiovascular:      Rate and Rhythm: Normal rate and regular rhythm. Heart sounds: No murmur heard. No friction rub. No gallop. Pulmonary:      Effort: Pulmonary effort is normal.      Breath sounds: Normal breath sounds. No wheezing, rhonchi or rales. Musculoskeletal:         General: Normal range of motion. Cervical back: Normal range of motion. No rigidity. Skin:     General: Skin is warm and dry. Neurological:      Mental Status: He is alert and oriented to person, place, and time. Coordination: Coordination normal.   Psychiatric:         Mood and Affect: Mood normal.         Behavior: Behavior normal.         Thought Content: Thought content normal.         Judgment: Judgment normal.          Assessment & Plan  Diagnoses and all orders for this visit:    1. Vegan diet  -     METABOLIC PANEL, COMPREHENSIVE; Future  -     LIPID PANEL; Future  -     VITAMIN D, 25 HYDROXY; Future  -     VITAMIN B12 & FOLATE; Future  -     IRON PROFILE; Future  -     VITAMIN B1, WHOLE BLOOD; Future    2. Vitamin D deficiency  -     VITAMIN D, 25 HYDROXY; Future    3. Dizziness  -     REFERRAL TO NEUROLOGY  Suspect related to herbal supplements. Pt agrees to hold all supplements for 1 week to assess for improvement. Would add supplements back one at a time, waiting an interval of 1 wk between each resumption.       Follow-up and Dispositions    · Return in about 3 weeks (around 1/11/2022) for lab review, dizziness, specialist Bill Mane MD

## 2021-12-22 ENCOUNTER — PATIENT MESSAGE (OUTPATIENT)
Dept: FAMILY MEDICINE CLINIC | Age: 61
End: 2021-12-22

## 2021-12-24 LAB — VIT B1 BLD-SCNC: 114.9 NMOL/L (ref 66.5–200)

## 2022-01-07 ENCOUNTER — DOCUMENTATION ONLY (OUTPATIENT)
Dept: FAMILY MEDICINE CLINIC | Age: 62
End: 2022-01-07

## 2022-01-11 ENCOUNTER — VIRTUAL VISIT (OUTPATIENT)
Dept: FAMILY MEDICINE CLINIC | Age: 62
End: 2022-01-11
Payer: COMMERCIAL

## 2022-01-11 DIAGNOSIS — Z71.2 ENCOUNTER TO DISCUSS TEST RESULTS: ICD-10-CM

## 2022-01-11 DIAGNOSIS — E78.5 HYPERLIPIDEMIA, UNSPECIFIED HYPERLIPIDEMIA TYPE: ICD-10-CM

## 2022-01-11 DIAGNOSIS — R42 DIZZINESS: Primary | ICD-10-CM

## 2022-01-11 DIAGNOSIS — U07.1 COVID-19: ICD-10-CM

## 2022-01-11 PROCEDURE — 99442 PR PHYS/QHP TELEPHONE EVALUATION 11-20 MIN: CPT | Performed by: NURSE PRACTITIONER

## 2022-01-11 NOTE — PROGRESS NOTES
Ariannarob Nguyen presents today for   Chief Complaint   Patient presents with    Follow-up     3 week    Dizziness     condition has cleared    Results     discuss lab results       Luis Cedillo preferred language for health care discussion is english/other. Is someone accompanying this pt? no    Is the patient using any DME equipment during [address]? no    Depression Screening:  3 most recent PHQ Screens 1/11/2022   Little interest or pleasure in doing things Not at all   Feeling down, depressed, irritable, or hopeless Not at all   Total Score PHQ 2 0       Learning Assessment:  Learning Assessment 1/11/2022   PRIMARY LEARNER Patient   HIGHEST LEVEL OF EDUCATION - PRIMARY LEARNER  4 YEARS OF COLLEGE   BARRIERS PRIMARY LEARNER NONE   CO-LEARNER CAREGIVER No   PRIMARY LANGUAGE ENGLISH    NEED No   LEARNER PREFERENCE PRIMARY DEMONSTRATION   ANSWERED BY patient   RELATIONSHIP SELF       Abuse Screening:  Abuse Screening Questionnaire 1/11/2022   Do you ever feel afraid of your partner? N   Are you in a relationship with someone who physically or mentally threatens you? N   Is it safe for you to go home? Y       Generalized Anxiety  No flowsheet data found. Health Maintenance Due   Topic Date Due    COVID-19 Vaccine (1) Never done    Shingrix Vaccine Age 50> (1 of 2) Never done    Flu Vaccine (1) Never done   . Health Maintenance reviewed and discussed and ordered per Provider. Coordination of Care:  1. Have you been to the ER, urgent care clinic since your last visit? Hospitalized since your last visit? no    2. Have you seen or consulted any other health care providers outside of the [address] [place] [address] since your last visit? Include any pap smears or colon screening. no      Advance Directive:  1. Do you have an advance directive in place?  Patient Reply:no

## 2022-01-11 NOTE — PROGRESS NOTES
Manuel Garcia is a 64 y.o. male, evaluated via audio-only technology on 1/11/2022 for Follow-up (3 week), Dizziness (condition has cleared), and Results (discuss lab results)    He was taking some herbal supplements. He stopped taking these and then restarted taking these one at a time and discovered which one was causing him to be dizzy so he has stopped this supplement. Lost 20lbs since Oct 2021. He reports he feels good and healthy. Tested positive for COVID on 1/1/2022. Just went out yesterday. He has been masking and social distancing. He does not have any symptoms currently. Assessment & Plan:   Diagnoses and all orders for this visit:    1. Dizziness    2. Encounter to discuss test results    3. COVID-19    4. Hyperlipidemia, unspecified hyperlipidemia type    Other orders  -     AIRBORNE ISOLATION; Standing  -     CONTACT ISOLATION; Standing  -     DROPLET ISOLATION; Standing  -     DROPLET PLUS; Standing  -     ENTERIC CONTACT ISOLATION; Standing    Dizziness has resolved. Continue to avoid supplements causing dizziness. Check supplements for folic acid and hold this due to his level. Continue with vegan diet. Cholesterol discussed. Will need have to have this rechecked in 3-4 months. Continue to mask, social distance, and have good hand hygiene. 12  Subjective:       Prior to Admission medications    Medication Sig Start Date End Date Taking? Authorizing Provider   calcium-cholecalciferol, d3, (CALCIUM 600 + D) 600-125 mg-unit tab Take  by mouth. Yes Provider, Historical   ascorbic acid, vitamin C, (Vitamin C) 500 mg tablet Take  by mouth. Yes Provider, Historical   multivitamins-minerals-lutein (MEN'S CENTRUM SILVER WITH LUTEIN) tab tablet Take 1 Tablet by mouth daily. Yes Provider, Historical   tamsulosin (FLOMAX) 0.4 mg capsule Take 1 Capsule by mouth daily (after dinner).  7/12/21  Yes Refugio Garcia NP     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[facility] [address] C09.9     Patient Active Problem List    Diagnosis Date Noted    Tonsil cancer ([facility] [address] 05/26/2021    Calculus of kidney     Night sweats     Urine frequency     Hyperlipidemia 08/02/2016    Prediabetes 08/02/2016    Anemia 08/02/2016    Osteoarthritis of spine with radiculopathy, cervical region 07/28/2016    Hx-TIA (transient ischemic attack) 07/28/2016    Migraine without status migrainosus, not intractable 07/28/2016    Headache(784.0) 09/09/2014    TIA (transient ischemic attack) 09/09/2014     Current Outpatient Medications   Medication Sig Dispense Refill    calcium-cholecalciferol, d3, (CALCIUM 600 + D) 600-125 mg-unit tab Take  by mouth.  ascorbic acid, vitamin C, (Vitamin C) 500 mg tablet Take  by mouth.  multivitamins-minerals-lutein (MEN'S CENTRUM SILVER WITH LUTEIN) tab tablet Take 1 Tablet by mouth daily.  tamsulosin (FLOMAX) 0.4 mg capsule Take 1 Capsule by mouth daily (after dinner). 90 Capsule 3     No Known Allergies  Past Medical History:   Diagnosis Date    Calculus of kidney     Headache     Joint pain     Migraine     Night sweats     Urine frequency      Past Surgical History:   Procedure Laterality Date    HX LYMPH NODE DISSECTION Right 10/2021    had tonsillar cancer mets; Dr. Víctor Ramires at [address] [place] [address] Left 2007    HX ORTHOPAEDIC      shoulders and knees scoped.  HX OTHER SURGICAL      inguinal hernia.     HX TONSIL AND ADENOIDECTOMY  06/2021     Family History   Adopted: Yes     Social History     Tobacco Use    Smoking status: Never Smoker    Smokeless tobacco: Never Used   Substance Use Topics    Alcohol use: Yes     Alcohol/week: 0.0 standard drinks     Comment: socially        Review of Systems   Constitutional: Negative for fever. HENT: Negative for congestion. Eyes: Negative for blurred vision. Respiratory: Negative for cough and shortness of breath. Cardiovascular: Negative for chest pain, palpitations and leg swelling. Gastrointestinal: Negative for abdominal pain, nausea and vomiting. Genitourinary: Negative. Musculoskeletal: Negative for falls. Neurological: Negative for dizziness. Psychiatric/Behavioral: Negative for depression and suicidal ideas. No flowsheet data found. Luis Cedillo, who was evaluated through a patient-initiated, synchronous (real-time) audio only encounter, and/or her healthcare decision maker, is aware that it is a billable service, with coverage as determined by his insurance carrier. He provided verbal consent to proceed: Yes. He has not had a related appointment within my department in the past 7 days or scheduled within the next 24 hours. On this date 01/11/2022 I have spent 15 minutes reviewing previous notes, test results and face to face (virtual) with the patient discussing the diagnosis and importance of compliance with the treatment plan as well as documenting on the day of the visit.     Chantelle Acosta NP

## 2022-01-20 ENCOUNTER — TELEPHONE (OUTPATIENT)
Dept: FAMILY MEDICINE CLINIC | Age: 62
End: 2022-01-20

## 2022-01-20 NOTE — TELEPHONE ENCOUNTER
----- Message from Dior Luna. Lanie sent at 1/16/2022  9:42 PM EST -----  Regarding: Sammi Silverman  I cant log into her MyChart for some reason but we need help. Her face has broken out again even worst than last week when she went to ER. They gave her oral steroids which cleared it up and today its back. Please call her at [phone] or you can call me.

## 2022-02-18 ENCOUNTER — TRANSCRIBE ORDER (OUTPATIENT)
Dept: SCHEDULING | Age: 62
End: 2022-02-18

## 2022-02-18 DIAGNOSIS — C09.0: Primary | ICD-10-CM

## 2022-02-25 ENCOUNTER — HOSPITAL ENCOUNTER (OUTPATIENT)
Dept: PET IMAGING | Age: 62
Discharge: HOME | End: 2022-02-25
Attending: INTERNAL MEDICINE
Payer: COMMERCIAL

## 2022-02-25 DIAGNOSIS — C09.0: ICD-10-CM

## 2022-02-25 PROCEDURE — A9552 F18 FDG: HCPCS

## 2022-02-25 RX ORDER — FLUDEOXYGLUCOSE F-18 200 MCI/ML
8 INJECTION INTRAVENOUS ONCE
Status: COMPLETED | OUTPATIENT
Start: 2022-02-25 | End: 2022-02-25

## 2022-02-25 RX ADMIN — FLUDEOXYGLUCOSE F-18 8 MILLICURIE: 200 INJECTION INTRAVENOUS at 15:30

## 2022-06-21 ENCOUNTER — OFFICE VISIT (OUTPATIENT)
Dept: ORTHOPEDIC SURGERY | Age: 62
End: 2022-06-21

## 2022-06-21 VITALS — HEIGHT: 71 IN | BODY MASS INDEX: 27.44 KG/M2 | WEIGHT: 196 LBS

## 2022-06-21 DIAGNOSIS — M54.50 LOW BACK PAIN, UNSPECIFIED BACK PAIN LATERALITY, UNSPECIFIED CHRONICITY, UNSPECIFIED WHETHER SCIATICA PRESENT: Primary | ICD-10-CM

## 2022-06-21 PROCEDURE — 99203 OFFICE O/P NEW LOW 30 MIN: CPT | Performed by: ORTHOPAEDIC SURGERY

## 2022-06-21 PROCEDURE — L0627 LO SAG RI AN/POS PNL PRE CST: HCPCS | Performed by: ORTHOPAEDIC SURGERY

## 2022-06-21 RX ORDER — METHYLPREDNISOLONE 4 MG/1
TABLET ORAL
Qty: 1 DOSE PACK | Refills: 0 | Status: SHIPPED
Start: 2022-06-21 | End: 2022-07-05

## 2022-06-21 NOTE — Clinical Note
6/24/2022    Patient: Jose F Collins   YOB: 1960   Date of Visit: 6/21/2022     Ravinder Weiner NP  Via     Dear Ravinder eWiner NP,      Thank you for referring Mr. Prisca Todd to [address] [place] [address] for evaluation. My notes for this consultation are attached. If you have questions, please do not hesitate to call me. I look forward to following your patient along with you.       Sincerely,    Nicole Jose MD

## 2022-06-21 NOTE — PATIENT INSTRUCTIONS
The patient's back is neurovascularly intact and appears to have good symmetry on exam with no muscle atrophy noted. Normal curvature of the spine with positive tenderness to palpation. Decreased range of motion in all planes secondary to pain but normal strength. No rashes, echymosis or other skin lesions noted. The patients bilateral lower extremities are grossly neurovascularly intact with good cap refill, full range of motion and strength. No swelling, echymosis or instability noted. Negative straight leg raise, negative bosch's and negative lachman's. No tenderness to palpation. No foot drop present and patient has a normal gait.

## 2022-06-21 NOTE — PROGRESS NOTES
Name: Janae Medellin    : 1960     Service Dept: [facility]    Chief Complaint   Patient presents with    Hip Pain        Visit Vitals  Ht 5' 11\" (1.803 m)   Wt 196 lb (88.9 kg)   BMI 27.34 kg/m²        No Known Allergies     Current Outpatient Medications   Medication Sig Dispense Refill    methylPREDNISolone (MEDROL DOSEPACK) 4 mg tablet Per dose pack instructions 1 Dose Pack 0    tadalafiL (CIALIS) 5 mg tablet Take 1 Tablet by mouth daily as needed for Erectile Dysfunction. 90 Tablet 3    tamsulosin (FLOMAX) 0.4 mg capsule Take 1 Capsule by mouth daily (after dinner). 90 Capsule 3    calcium-cholecalciferol, d3, (CALCIUM 600 + D) 600-125 mg-unit tab Take  by mouth.  ascorbic acid, vitamin C, (Vitamin C) 500 mg tablet Take  by mouth.  multivitamins-minerals-lutein (MEN'S CENTRUM SILVER WITH LUTEIN) tab tablet Take 1 Tablet by mouth daily. 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 Marla Farley at  [facility] Left 2007    HX ORTHOPAEDIC      shoulders and knees scoped.  HX OTHER SURGICAL      inguinal hernia.  HX TONSIL AND ADENOIDECTOMY  06/2021      Past Medical History:   Diagnosis Date    Calculus of kidney     Headache     Joint pain     Migraine     Night sweats     Urine frequency         I have reviewed and agree with PFSH and ROS and intake form in chart and the record furthermore I have reviewed prior medical record(s) regarding this patients care during this appointment. Review of Systems:   Patient is a pleasant appearing individual, appropriately dressed, well hydrated, well nourished, who is alert, appropriately oriented for age, and in no acute distress with a normal gait and normal affect who does not appear to be in any significant pain. Physical Exam:  The patient's back is neurovascularly intact and appears to have good symmetry on exam with no muscle atrophy noted. Normal curvature of the spine with positive tenderness to palpation. Decreased range of motion in all planes secondary to pain but normal strength. No rashes, echymosis or other skin lesions noted. The patients bilateral lower extremities are grossly neurovascularly intact with good cap refill, full range of motion and strength. No swelling, echymosis or instability noted. Negative straight leg raise, negative bosch's and negative lachman's. No tenderness to palpation. No foot drop present and patient has a normal gait. Please note that a DME was provided from our office and fitted to an appropriate size. DME provided will help decrease soft tissue swelling, assist with stabilization, and decrease pain with immobilization.      Encounter Diagnoses     ICD-10-CM ICD-9-CM   1. Low back pain, unspecified back pain laterality, unspecified chronicity, unspecified whether sciatica present  M54.50 724.2       HPI:  The patient is here with a chief complaint of left hip pain, throbbing, burning pain. Pain is 8/10. X-rays of the left hip are unremarkable. Continues to have difficulty, especially in the lumbar region. X-rays are positive for L5-S1 severe degenerative changes. Assessment/Plan:  Plan at this point, we will have him see a spine specialist.  Also we will get him an appointment with a spine specialist, back brace, Medrol Kwesi and go from there. As part of continued conservative pain management options the patient was advised to utilize Tylenol or OTC NSAIDS as long as it is not medically contraindicated. Return to Office: Follow-up and Dispositions    · Return if symptoms worsen or fail to improve. Scribed by Maye Souza LPN as dictated by [facility] [place] SPENCER Martini MD.  Documentation True and Accepted Lion Martini MD

## 2022-07-22 NOTE — PROGRESS NOTES
[facility]  [address], [place]  Phone: [phone]  Fax: [phone]        INITIAL CONSULTATION      HISTORY OF PRESENT ILLNESS:  Rene Kennedy is a 58 y.o. male whom is referred from Jame Garcia MD secondary to progressive low back pain after lifting heavy boxes a couple of months ago. He rates his pain 4-8/10. Pt reports his pain had initially been along his left side but now radiates across his lower back into his sides. His pain is exacerbated with prolonged positions. The pt admits he has treated with a MDP through Dr. Cookie Gonzáles with temporary relief. He has been treating with ice compresses and a heating pad with relief. He has been treating with Motrin 800 mg BID with benefit. Pt reports he has treated with Flexeril but causes brain fog. He has treated with Lidoderm patches w/o relief, notes patches irritated his skin. Patient denies previous spinal surgery, injections, or physical therapy/chiropractic care. Pt denies change in bowel or bladder habits. Pt denies fever, weight loss, or skin changes. Pt denies h/o stomach ulcers or bleeding disorders. Denies DM. Pt is a nonsmoker. PmHx of TIA, migraine HA's, Kidney stones. Pt reports he had a radical neck dissection in 10/2021 secondary to thyroid/lymph node cancer Note from Jame Garcia MD dated 6/21/2022 indicating patient was seen with c/o low back pain and left hip pain. Describes pain as throbbing/burning pain. Pain is 8/10. Left hip XR unremarkable. Provided with a MDP and referred to spine. Lumbar spine XR dated 6/21/2022 films independently reviewed. Per Dr. Maira Ace note, positive for L5-S1 severe degenerative changes. Per my review, mild disc space narrowing at L1-2 and L2-3. Moderate to severe disc space narrowing at L5-S1 No acute pathology identified. The patient is RHD.  reviewed. Body mass index is 28.45 kg/m².     PCP: Nidhi Tate NP    Past Medical History:   Diagnosis Date    Calculus of kidney     Headache     Joint pain     Migraine     Night sweats     Urine frequency           Past Surgical History:   Procedure Laterality Date    HX LYMPH NODE DISSECTION Right 10/2021    had tonsillar cancer mets; Dr. Yudy Leger at [facility]    HX MOHS PROCEDURES Left 2007    HX ORTHOPAEDIC      shoulders and knees scoped. HX OTHER SURGICAL      inguinal hernia. HX TONSIL AND ADENOIDECTOMY  06/2021         Social History     Tobacco Use    Smoking status: Never    Smokeless tobacco: Never   Substance Use Topics    Alcohol use: Yes     Alcohol/week: 0.0 standard drinks     Comment: socially        Work status: N/A  Marital status: N/A . Current Outpatient Medications   Medication Sig Dispense Refill    tamsulosin (FLOMAX) 0.4 mg capsule Take 2 Capsules by mouth daily (after dinner). 180 Capsule 3    tadalafiL (CIALIS) 5 mg tablet Take 1 Tablet by mouth daily as needed for Erectile Dysfunction. 90 Tablet 3    calcium-cholecalciferol, d3, (CALCIUM 600 + D) 600-125 mg-unit tab Take  by mouth. ascorbic acid, vitamin C, (VITAMIN C) 500 mg tablet Take  by mouth.      multivitamins-minerals-lutein (MEN'S CENTRUM SILVER WITH LUTEIN) tab tablet Take 1 Tablet by mouth daily. No Known Allergies         Family History   Adopted: Yes         REVIEW OF SYSTEMS  Constitutional symptoms: Negative  Eyes: Negative  Ears, Nose, Throat, and Mouth: Negative  Cardiovascular: Negative  Respiratory: Negative  Genitourinary: Negative  Integumentary (Skin and/or breast): Negative  Musculoskeletal: Positive for low back pain  Extremities: Negative for edema.   Endocrine/Rheumatologic: Negative  Hematologic/Lymphatic: Negative  Allergic/Immunologic: Negative  Psychiatric: Negative       PHYSICAL EXAMINATION  Visit Vitals  Pulse 72   Temp 97.5 °F (36.4 °C) (Temporal)   Ht 5' 11\" (1.803 m)   Wt 204 lb (92.5 kg)   SpO2 98%   BMI 28.45 kg/m²       CONSTITUTIONAL: NAD, A&O x 3  HEART: Regular rate and rhythm  GASTROINTESTINAL: Positive bowel sounds, soft, nontender, and nondistended  LUNGS: Clear to auscultation bilaterally. SKIN: Negative for rash. RANGE OF MOTION: The patient has full passive range of motion in all four extremities. SENSATION: sensation is intact to light touch throughout. MOTOR:   Straight Leg Raise: Negative, bilateral  Tilley: Negative, bilateral  Tandem Gait: Neg. Deep tendon reflexes are 0 at the biceps, 0 at the brachioradialis and trace at the triceps, bilaterally. Deep tendon reflexes are 1 at the knees and 0 at the ankles bilaterally. Shoulder AB/Flex Elbow Flex Wrist Ext Elbow Ext Wrist Flex Hand Intrin Tone   Right +4/5 +4/5 +4/5 +4/5 +4/5 +4/5 +4/5   Left +4/5 +4/5 +4/5 +4/5 +4/5 +4/5 +4/5              Hip Flex Knee Ext Knee Flex Ankle DF GTE Ankle PF Tone   Right +4/5 +4/5 +4/5 +4/5 +4/5 +4/5 +4/5   Left +4/5 +4/5 +4/5 +4/5 +4/5 +4/5 +4/5       ASSESSMENT   Diagnoses and all orders for this visit:    1. Lumbar pain    2. DDD (degenerative disc disease), lumbar    3. Facet arthropathy         IMPRESSIONS/RECOMMENDATIONS:  Patient presents today with c/o progressive low back pain. Multiple treatment options were discuss. Pt recently completed MDP through Dr. Suzy Ruiz with temporary relief. I will provide the pt with a prescription for Skelaxin 400 mg prn. I will refer him to physical therapy with an emphasis on HEP. Patient is neurologically intact. I will see the patient back in 6 week's time or earlier if needed. Written by Juliette Cervantes, as dictated by Margo Wu MD  I examined the patient, reviewed and agree with the note.

## 2022-07-25 ENCOUNTER — OFFICE VISIT (OUTPATIENT)
Dept: ORTHOPEDIC SURGERY | Age: 62
End: 2022-07-25
Payer: COMMERCIAL

## 2022-07-25 ENCOUNTER — TELEPHONE (OUTPATIENT)
Dept: ORTHOPEDIC SURGERY | Age: 62
End: 2022-07-25

## 2022-07-25 VITALS
HEART RATE: 72 BPM | BODY MASS INDEX: 28.56 KG/M2 | OXYGEN SATURATION: 98 % | TEMPERATURE: 97.5 F | WEIGHT: 204 LBS | HEIGHT: 71 IN

## 2022-07-25 DIAGNOSIS — M51.36 DDD (DEGENERATIVE DISC DISEASE), LUMBAR: ICD-10-CM

## 2022-07-25 DIAGNOSIS — M47.819 FACET ARTHROPATHY: ICD-10-CM

## 2022-07-25 DIAGNOSIS — M54.50 LUMBAR PAIN: Primary | ICD-10-CM

## 2022-07-25 PROCEDURE — 99204 OFFICE O/P NEW MOD 45 MIN: CPT | Performed by: PHYSICAL MEDICINE & REHABILITATION

## 2022-07-25 RX ORDER — METAXALONE 800 MG/1
400 TABLET ORAL
Qty: 60 TABLET | Refills: 0 | Status: SHIPPED | OUTPATIENT
Start: 2022-07-25

## 2022-07-25 NOTE — TELEPHONE ENCOUNTER
Lyssa faxed over PA for Metaxalone 800 MG tablets.     UYO:[id number]    [phone]  F [phone]

## 2022-07-25 NOTE — Clinical Note
7/25/2022    Patient: Kala Patton   YOB: 1960   Date of Visit: 7/25/2022     Edelmira Boyd NP  Via      Constanza Leung MD  [address] [address]  Via In Basket    Dear JON Esqueda MD,      Thank you for referring Mr. Martha Ortiz to [address] for evaluation. My notes for this consultation are attached. If you have questions, please do not hesitate to call me. I look forward to following your patient along with you.       Sincerely,    Renetta García MD

## 2022-07-27 NOTE — TELEPHONE ENCOUNTER
Justin MULLIGAN (Key: [id number]) - [id number]  Metaxalone 800MG tablets       Status: PA Response - Approved    Created: July 25th, 2022 [phone]    Sent: July 25th, 2022    Open  Archive

## 2022-08-08 ENCOUNTER — HOSPITAL ENCOUNTER (OUTPATIENT)
Dept: PHYSICAL THERAPY | Age: 62
Discharge: HOME | End: 2022-08-08
Attending: PHYSICAL MEDICINE & REHABILITATION
Payer: COMMERCIAL

## 2022-08-08 PROCEDURE — 97162 PT EVAL MOD COMPLEX 30 MIN: CPT

## 2022-08-08 NOTE — PROGRESS NOTES
[facility]              [address]        [place], [address]   [phone] fax    Plan of Care/ Statement of Necessity for Physical Therapy Services  Patient name: Elizabeth Terry Start of Care: 2022   Referral source: Shabbir Rush MD : 1960    Medical Diagnosis: Other low back pain [M54.59]  Payor: [place] [id number] / Plan: [address] / Product Type: Managed Care Medicaid /  Onset Date:2022    Treatment Diagnosis: Lumbar Hypomobility   Prior Hospitalization: see medical history Provider#: [id number]   Medications: Verified on Patient summary List    Comorbidities: TIA (), Migraine HA, 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 Assessment:    Patient presents with bilateral lumbar pain with circumferential radiation into bilateral anterior hips with onset after lifting a heavy item 2022. Objectively patient noted to demonstrate hypomobility and pain upon assessment of joint mobility, central and right unilateral, of the thoracolumbar junction with patient objectively demonstrating normal hip and SIJ screen. Patient as well endorses a 2 month history of new-onset constipation with therapist questioning correlation to symptom presentation. With provision of patient education regarding importance of appropriate fiber intake with patient encouraged to complete a symptom diary to assess if there is any correlation between lower gastrointestinal and musculoskeletal symptoms.  To emphasize improvements in available joint mobility of the thoracolumbar junction to enable patient to return back to PLOF without restriction. Patient will continue to benefit from skilled PT services to modify and progress therapeutic interventions, address functional mobility deficits, address ROM deficits, address strength deficits, analyze and address soft tissue restrictions, analyze and cue movement patterns, analyze and modify body mechanics/ergonomics, assess and modify postural abnormalities, address imbalance/dizziness, and instruct in home and community integration to attain remaining goals.     Key Information:    BP: 124/72 mmHg  Posture: Symmetrical iliac crest height with symmetrical weightbearing through bilateral LE     Gait:                [x] Normal        [] Abnormal:     Lumbar Active Movements: [] N/A   [] Too acute   [] Other:  ROM % AROM Comments   Forward flexion 40-60 0% limited No pain with OP   Extension 20-30 25% limited Right lumbar pain 4/10   SB right 20-30 25% limited     SB left 20-30 25% limited Right lumbar sensitivity    Rotation right 5-10 0% limited     Rotation left 5-10 0% limited        Palpation: TTP L1-L3 SP     Joint Mobility              Central Lumbar PA: Pain L1-L3 (grade I-II pressure)              Unilateral Lumbar PA: Left L1-L5 Normal+Pain-free, Right L1-L3 Painful (grade I-II)  LE MMT      Left Right   Hip Flexion 5 5     Abduction 3+ 3+     ER 5 5     IR 5 5   Knee Flexion 5 5     Extension 5 5   *Assessed in supine     Special Tests  [place]:     Thigh Thrust:   [x] R    [x] L    [] +    [x] -                          Leg Length:     [] +    [x] -   Position:          Hip:            Maria Eugenia:              [x] R    [x] L    [] +    [x] -                          Scour:              [x] R    [x] L    [] +    [x] -                          FADDIR:          [x] R    [x] L    [] +    [x] -    Evaluation Complexity History MEDIUM  Complexity : 1-2 comorbidities / personal factors will impact the outcome/ POC ; Examination MEDIUM Complexity : 3 Standardized tests and measures addressing body structure, function, activity limitation and / or participation in recreation  ;Presentation MEDIUM Complexity : Evolving with changing characteristics  ; Clinical Decision Making MEDIUM Complexity : FOTO score of 26-74  Overall Complexity Rating: MEDIUM  Problem List: pain affecting function, decrease ROM, decrease strength, impaired gait/ balance, decrease ADL/ functional abilitiies, decrease activity tolerance, decrease flexibility/ joint mobility, and decrease transfer abilities   Treatment Plan may include any combination of the following: Therapeutic exercise, Therapeutic activities, Neuromuscular re-education, Physical agent/modality, Gait/balance training, Manual therapy, Patient education, Self Care training, Functional mobility training, Home safety training, and Stair training  Patient / Family readiness to learn indicated by: asking questions, trying to perform skills, and interest  Persons(s) to be included in education: patient (P)  Barriers to Learning/Limitations: None  Patient Goal (s): Relief of pain  Patient Self Reported Health Status: excellent  Rehabilitation Potential: good    Short Term Goals: To be accomplished in 3 weeks:  1. Patient will subjectively report full compliance with prescribed HEP. Eval: HEP provided  2. Patient will demonstrate standing lumbar extension AROM </= 25% limited without pain to improve ease with overhead ADLs. Eval: Standing Lumbar Extension AROM = 25% limited (right lumbar pain 4/10)  3. Patient will demonstrate left/right hip abduction MMT >/= 4+/5 to improve ease with long-distance ambulation. Eval: Left Hip Abduction MMT = 3+/5, Right Hip Abduction MMT = 3+/5    Long Term Goals: To be accomplished in 6 weeks:  1. Patient will demonstrate a significant functional improvement as demonstrated by a score of >/= 63 on FOTO.   Eval: FOTO = 47 2. Patient will demonstrate ability to perform deadlift x10 repetitions with 85 lb without pain to enable return to previous exercise regime. Eval: NT  3. Patient will subjectively report pain at worst in last week </= 2/10 to improve overall quality of life. Eval: Pain at Worst (last week) = 10/10    Frequency / Duration: Patient to be seen 2 times per week for 6 weeks. Patient/ Caregiver education and instruction: Diagnosis, prognosis, self care, activity modification, and exercises   [x]  Plan of care has been reviewed with BROOKE Austin, PT 8/8/2022 9:32 AM  ________________________________________________________________________    I certify that the above Therapy Services are being furnished while the patient is under my care. I agree with the treatment plan and certify that this therapy is necessary.     [facility] Signature:____________Date:_________TIME:________     Charity Stallworth MD  ** Signature, Date and Time must be completed for valid certification **  Please sign and return to [facility] - [address], [facility] [place]   [phone] fax

## 2022-08-08 NOTE — PROGRESS NOTES
PT DAILY TREATMENT NOTE     Patient Name: Summer Jimenez  Date:2022  : 1960  [x]  Patient  Verified  Payor: Esther 22 / Plan: [address] / Product Type: Managed Care Medicaid /    In time:1004  Out time:1045  Total Treatment Time (min): 41  Visit #: 1 of 12    Treatment Area: Other low back pain [M54.59]    Physical Therapy Evaluation - Lumbar    SUBJECTIVE      Any medication changes, allergies to medications, adverse drug reactions, diagnosis change, or new procedure performed?: [x] No    [] Yes (see summary sheet for update)    Subjective functional status/changes:     PLOF: Father, Workouts Regularly, Regular Walking Regime, (I) Functional ADLs, (I) Self-Care ADLs, Work Full-Time  Current Functional Status: Modification of gym regime, Inability to perform heavy lifting,   [facility] [employer] [employer]  Living Situation: Lives with Family  Comorbidities: TIA (), Migraine HA, 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 Patient reports that his pain was initially along the left-side of his back but now radiates across his lower back and into his side with provocation of pain with prolonged positions. Patient denies changes in bowel and bladder habits. With completion of x-rays which demonstrated arthritis. Patient reports no relief with stretching nor chiropractic care. Patient denies prior history of low back pain. Patient reports that pain radiates horizontally with radiation into anterior groin bilaterally. Patient reports having to modify his workout routine with cessation of dynamic exercises.  Patient reports increase in pain with rotational movements and heavy lifting. Patient reports slight improvement since onset of symptoms. Patient reports requirement to break up driving secondary to pain. Patient denies LE radicular symptoms, N/T nor weakness. Patient denies clicking, catching nor locking of the hips. Patient reports use of Flomax secondary to prostatitis x6 months. Patient does as well endorse constipation with patient reporting onset of symptoms within the last 2 months. Patient denies pain with bowel movements. Patient reports no sleep disturbances but reports pain in AM which radiates into the front of the hips. Pain Intensity (0-10, VAS): Current 3, Worst 10, Best 1    Patient Goals: \"Relief of pain. \"     Lumbar spine XR (per MD note 7/25/2022) dated 6/21/2022 films independently reviewed. Per Dr. Charmayne Reil note, positive for L5-S1 severe degenerative changes. Per my review, mild disc space narrowing at L1-2 and L2-3. Moderate to severe disc space narrowing at L5-S1 No acute pathology identified.      OBJECTIVE EXAMINATION    BP: 124/72 mmHg  Posture: Symmetrical iliac crest height with symmetrical weightbearing through bilateral LE    Gait:  [x] Normal     [] Abnormal:    Active Movements: [] N/A   [] Too acute   [] Other:  ROM % AROM Comments   Forward flexion 40-60 0% limited No pain with OP   Extension 20-30 25% limited Right lumbar pain 4/10   SB right 20-30 25% limited    SB left 20-30 25% limited Right lumbar sensitivity    Rotation right 5-10 0% limited    Rotation left 5-10 0% limited      Palpation: TTP L3 SP    Joint Mobility   Central Lumbar PA: Pain L1-L3 (grade I-II pressure)   Unilateral Lumbar PA: Left L1-L5 Normal+Pain-free, Right L1-L3 Painful (grade I-II)  LE MMT    Left Right   Hip Flexion 5 5    Abduction 3+ 3+    ER 5 5    IR 5 5   Knee Flexion 5 5    Extension 5 5   *Assessed in supine      Special Tests  [place]:  Thigh Thrust: [x] R    [x] L    [] +    [x] -     Leg Length: [] +    [x] -   Position:         Hip: Jerry Win:  [x] R    [x] L    [] +    [x] -     Scour:  [x] R    [x] L    [] +    [x] -     FADDIR: [x] R    [x] L    [] +    [x] -     OBJECTIVE    41 min [x]Eval                  []Re-Eval     See Eval min Therapeutic Exercise:  [x] See flow sheet : Patient educated regarding completion of prescribed HEP and provided with written HEP instructions, Patient educated regarding diagnosis and PT POC   Rationale: increase ROM and increase strength to improve the patients ability to improve ease with functional ADLs    See Eval min Therapeutic Activity:  [x]  See flow sheet : Discussion regarding potential correlation of lower GI symptoms/constipation to symptom presentation with education to assess fiber intake and diet   Rationale: increase ROM, increase strength, and increase proprioception  to improve the patients ability to improve quality of life. With   [] TE   [] TA   [] neuro   [] other: Patient Education: [x] Review HEP    [] Progressed/Changed HEP based on:   [] positioning   [] body mechanics   [] transfers   [] heat/ice application    [] other:      Other Objective/Functional Measures: See objective above. Pain Level (0-10 scale) post treatment: 3    ASSESSMENT/Changes in Function: Patient presents with bilateral lumbar pain with circumferential radiation into bilateral anterior hips with onset after lifting a heavy item 6/2022. Objectively patient noted to demonstrate hypomobility and pain upon assessment of joint mobility, central and right unilateral, of the thoracolumbar junction with patient objectively demonstrating normal hip and SIJ screen. Patient as well endorses a 2 month history of new-onset constipation with therapist questioning correlation to symptom presentation.  With provision of patient education regarding importance of appropriate fiber intake with patient encouraged to complete a symptom diary to assess if there is any correlation between lower gastrointestinal and musculoskeletal symptoms. To emphasize improvements in available joint mobility of the thoracolumbar junction to enable patient to return back to PLOF without restriction. Patient will continue to benefit from skilled PT services to modify and progress therapeutic interventions, address functional mobility deficits, address ROM deficits, address strength deficits, analyze and address soft tissue restrictions, analyze and cue movement patterns, analyze and modify body mechanics/ergonomics, assess and modify postural abnormalities, address imbalance/dizziness, and instruct in home and community integration to attain remaining goals. [x]  See Plan of Care  []  See progress note/recertification  []  See Discharge Summary         Progress towards goals / Updated goals:    Short Term Goals: To be accomplished in 3 weeks:  1. Patient will subjectively report full compliance with prescribed HEP. Eval: HEP provided  2. Patient will demonstrate standing lumbar extension AROM </= 25% limited without pain to improve ease with overhead ADLs. Eval: Standing Lumbar Extension AROM = 25% limited (right lumbar pain 4/10)  3. Patient will demonstrate left/right hip abduction MMT >/= 4+/5 to improve ease with long-distance ambulation. Eval: Left Hip Abduction MMT = 3+/5, Right Hip Abduction MMT = 3+/5    Long Term Goals: To be accomplished in 6 weeks:  1. Patient will demonstrate a significant functional improvement as demonstrated by a score of >/= 63 on FOTO. Eval: FOTO = 47       2. Patient will demonstrate ability to perform deadlift x10 repetitions with 85 lb without pain to enable return to previous exercise regime. Eval: NT  3. Patient will subjectively report pain at worst in last week </= 2/10 to improve overall quality of life.   Eval: Pain at Worst (last week) = 10/10      PLAN  [x]  Upgrade activities as tolerated     []  Continue plan of care  []  Update interventions per flow sheet       []  Discharge due to:_  []  Other:_      Giovany Lipps, PT 8/8/2022  9:25 AM    Future Appointments   Date Time Provider Orin Nelda   8/8/2022 10:15 AM Mirna Severino [address]   8/17/2022 11:00 AM Zachariah Carlson   9/12/2022  8:45 AM Lilliam Heard MD [facility]   10/14/2022  9:00 AM Sonia Gonzalez [address]

## 2022-08-15 ENCOUNTER — PATIENT MESSAGE (OUTPATIENT)
Dept: ORTHOPEDIC SURGERY | Age: 62
End: 2022-08-15

## 2022-08-15 NOTE — TELEPHONE ENCOUNTER
7-25-22  IMPRESSIONS/RECOMMENDATIONS:  Patient presents today with c/o progressive low back pain. Multiple treatment options were discuss. Pt recently completed MDP through Dr. Rika Hernandez with temporary relief. I will provide the pt with a prescription for Skelaxin 400 mg prn. I will refer him to physical therapy with an emphasis on HEP. Patient is neurologically intact.  I will see the patient back in 6 week's time or earlier if needed

## 2022-08-15 NOTE — TELEPHONE ENCOUNTER
Hannah Adams MD  You 2 hours ago (10:22 AM)     NP    Has [employer]. Didn't order MRI. Typically will not get approved if neurologically intact & haven't had PT. Difficulty walking because of pain? Bowel & bladder working? Can try Neurontin 300 tid # 90 rf 1. Has PT started yet?

## 2022-08-17 ENCOUNTER — TELEPHONE (OUTPATIENT)
Dept: PHYSICAL THERAPY | Age: 62
End: 2022-08-17

## 2022-08-23 ENCOUNTER — TELEPHONE (OUTPATIENT)
Dept: PHYSICAL THERAPY | Age: 62
End: 2022-08-23

## 2022-09-07 ENCOUNTER — HOSPITAL ENCOUNTER (OUTPATIENT)
Dept: PHYSICAL THERAPY | Age: 62
Discharge: HOME | End: 2022-09-07
Attending: PHYSICAL MEDICINE & REHABILITATION
Payer: COMMERCIAL

## 2022-09-07 PROCEDURE — 97530 THERAPEUTIC ACTIVITIES: CPT

## 2022-09-07 PROCEDURE — 97110 THERAPEUTIC EXERCISES: CPT

## 2022-09-07 PROCEDURE — 97112 NEUROMUSCULAR REEDUCATION: CPT

## 2022-09-07 NOTE — PROGRESS NOTES
PT DAILY TREATMENT NOTE     Patient Name: Jens Valentine  GCKF:[id number]  : 1960  [x]  Patient  Verified  Payor: Esther 22 / Plan: [address] / Product Type: Managed Care Medicaid /    In time:450  Out time:517  Total Treatment Time (min): 27  Visit #: 2 of 12    Treatment Area: Other low back pain [M54.59]    SUBJECTIVE  Pain Level (0-10 scale): 0  Any medication changes, allergies to medications, adverse drug reactions, diagnosis change, or new procedure performed?: [x] No    [] Yes (see summary sheet for update)  Subjective functional status/changes:   [] No changes reported  Patient reports having a significant flare, without known reason, with patient reporting resolution with medication. And time. Patient reports since this time with some intermittent flares but with ability to return back to free weights with gym routine with minimal restriction.      OBJECTIVE    8 min Therapeutic Exercise:  [x] See flow sheet : Emphasis placed on improving available lumbopelvic and LE AROM and strength   Rationale: increase ROM and increase strength to improve the patients ability to improve ease with functional ADLs    8 min Therapeutic Activity:  [x]  See flow sheet : Promotion of lumbopelvic AROM to improve ease with bed mobility and functional transfers   Rationale: increase ROM and increase strength  to improve the patients ability to improve ease with bed mobility     11 min Neuromuscular Re-education:  [x]  See flow sheet :  Emphasis placed on improving activation and recruitment of the anterior abdominal and gluteal musculature and improving LE proprioceptive and kinesthetic awareness   Rationale: increase ROM and increase strength  to improve the patients ability to improve ease with work-related ADLs         With   [] TE   [] TA   [] neuro   [] other: Patient Education: [x] Review HEP    [] Progressed/Changed HEP based on:   [] positioning   [] body mechanics   [] transfers   [] heat/ice application    [] other:      Other Objective/Functional Measures: See goals below. Pain Level (0-10 scale) post treatment: 7    ASSESSMENT/Changes in Function: Patient presents today for reassessment with patient having not been seen since initial evaluation 8/8/2022 secondary to requirement to await receival of insurance authorization and patient time constraints. Despite lack of attendance patient with subjective overall improvement in symptoms with subjective reduction in symptom severity and irritability. Objectively patient with significant improvement in available pain-free lumbar AROM, with subjective report of return back to resistance exercise regime, with modification. Despite objective improvement in symptoms with continued prominent TTP noted to L1-L3 transverse process with application of low grade pressure. Patient does continue to report provocation of symptoms with non-specific aggravating factors with patient within clinic noted to demonstrate pain provocation with hip hinging and lumbar rotational AROM. Patient will continue to benefit from skilled PT services to modify and progress therapeutic interventions, address functional mobility deficits, address ROM deficits, address strength deficits, analyze and address soft tissue restrictions, analyze and cue movement patterns, analyze and modify body mechanics/ergonomics, assess and modify postural abnormalities, address imbalance/dizziness, and instruct in home and community integration to attain remaining goals. []  See Plan of Care  [x]  See progress note/recertification  []  See Discharge Summary         Progress towards goals / Updated goals:    Short Term Goals: To be accomplished in 3 weeks:  1. Patient will subjectively report full compliance with prescribed HEP. Eval: HEP provided  Current: Progressing, HEP initiated without full compliance, 9/7/2022  2.  Patient will demonstrate standing lumbar extension AROM </= 25% limited without pain to improve ease with overhead ADLs. Eval: Standing Lumbar Extension AROM = 25% limited (right lumbar pain 4/10)  Current: Met, Standing Lumbar Extension AROM = 0% limited, 9/7/2022  3. Patient will demonstrate left/right hip abduction MMT >/= 4+/5 to improve ease with long-distance ambulation. Eval: Left Hip Abduction MMT = 3+/5, Right Hip Abduction MMT = 3+/5  Current: Progressing, Left Hip Abduction MMT = 4/5, Right Hip Abduction MMT = 4/5, 9/7/2022     Long Term Goals: To be accomplished in 6 weeks:  1. Patient will demonstrate a significant functional improvement as demonstrated by a score of >/= 63 on FOTO. Eval: FOTO = 47       Current: Met, FOTO = 67, 9/7/2022  2. Patient will demonstrate ability to perform deadlift x10 repetitions with 85 lb without pain to enable return to previous exercise regime. Eval: NT  Current: Progressing, Deadlift x8 repetitions with 45 lb with right lumbar pain 7/10, 9/7/2022  3. Patient will subjectively report pain at worst in last week </= 2/10 to improve overall quality of life.   Eval: Pain at Worst (last week) = 10/10  Current: Progressing, Pain at Worst (last week) = 5/10, 9/7/2022    PLAN  [x]  Upgrade activities as tolerated     [x]  Continue plan of care  []  Update interventions per flow sheet       []  Discharge due to:_  []  Other:_      Lexie Bernardo, PT 9/7/2022  8:39 AM    Future Appointments   Date Time Provider Orin Lutz   9/7/2022  5:00 PM Morris Garcia [facility]   9/9/2022  8:45 AM Memo Marie PT [facility]   9/12/2022  8:45 AM [facility]   9/13/2022  2:45 PM Memo Marie PT [facility]   10/14/2022  9:00 AM Doroteo Gonzalez[address]   8/18/2023 11:00 AM Cyo Ortiz PA-C [address] [facility]

## 2022-09-07 NOTE — PROGRESS NOTES
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 Patient will subjectively report full compliance with prescribed HEP. Eval: HEP provided  Current: Progressing, HEP initiated without full compliance  2. Patient will demonstrate standing lumbar extension AROM </= 25% limited without pain to improve ease with overhead ADLs. Eval: Standing Lumbar Extension AROM = 25% limited (right lumbar pain 4/10)  Current: Met, Standing Lumbar Extension AROM = 0% limited  3. Patient will demonstrate left/right hip abduction MMT >/= 4+/5 to improve ease with long-distance ambulation. Eval: Left Hip Abduction MMT = 3+/5, Right Hip Abduction MMT = 3+/5  Current: Progressing, Left Hip Abduction MMT = 4/5, Right Hip Abduction MMT = 4/5     Long Term Goals: To be accomplished in 6 weeks:  1.  Patient will demonstrate a significant functional improvement as demonstrated by a score of >/= 63 on FOTO.  Eval: FOTO = 47       Current: Met, FOTO = 67  2. Patient will demonstrate ability to perform deadlift x10 repetitions with 85 lb without pain to enable return to previous exercise regime. Eval: NT  Current: Progressing, Deadlift x8 repetitions with 45 lb with right lumbar pain 7/10  3. Patient will subjectively report pain at worst in last week </= 2/10 to improve overall quality of life. Eval: Pain at Worst (last week) = 10/10  Current: Progressing, Pain at Worst (last week) = 5/10    Key Functional Changes: See goals above. Updated Goals: to be achieved in 4 weeks:  1. Patient will subjectively report full compliance with prescribed HEP. At PN: Progressing, HEP initiated without full compliance  2. Patient will demonstrate left/right hip abduction MMT >/= 4+/5 to improve ease with long-distance ambulation. At PN: Progressing, Left Hip Abduction MMT = 4/5, Right Hip Abduction MMT = 4/5  3. Patient will demonstrate ability to perform deadlift x10 repetitions with 85 lb without pain to enable return to previous exercise regime. At PN: Progressing, Deadlift x8 repetitions with 45 lb with right lumbar pain 7/10  4. Patient will subjectively report pain at worst in last week </= 2/10 to improve overall quality of life. At PN: Progressing, Pain at [address] (last week) = 5/10    ASSESSMENT/RECOMMENDATIONS:    Patient presents today for reassessment with patient having not been seen since initial evaluation 8/8/2022 secondary to requirement to await receival of insurance authorization and patient time constraints. Despite lack of attendance patient with subjective overall improvement in symptoms with subjective reduction in symptom severity and irritability. Objectively patient with significant improvement in available pain-free lumbar AROM, with subjective report of return back to resistance exercise regime, with modification.  Despite objective improvement in symptoms with continued prominent TTP noted to L1-L3 transverse process with application of low grade pressure. Patient does continue to report provocation of symptoms with non-specific aggravating factors with patient within clinic noted to demonstrate pain provocation with hip hinging and lumbar rotational AROM. Patient will continue to benefit from skilled PT services to modify and progress therapeutic interventions, address functional mobility deficits, address ROM deficits, address strength deficits, analyze and address soft tissue restrictions, analyze and cue movement patterns, analyze and modify body mechanics/ergonomics, assess and modify postural abnormalities, address imbalance/dizziness, and instruct in home and community integration to attain remaining goals. [x]Continue therapy per initial plan/protocol at a frequency of  2 x per week for 4 weeks  []Continue therapy with the following recommended changes:_____________________      _____________________________________________________________________  []Discontinue therapy progressing towards or have reached established goals  []Discontinue therapy due to lack of appreciable progress towards goals  []Discontinue therapy due to lack of attendance or compliance  []Await Physician's recommendations/decisions regarding therapy  []Other:________________________________________________________________    Thank you for this referral.    Angel Luis Ayon, PT 9/7/2022 5:41 PM  NOTE TO PHYSICIAN:  PLEASE COMPLETE THE ORDERS BELOW AND   FAX TO [facility]: [phone]  If you are unable to process this request in 24 hours please contact our office: [phone]    []  I have read the above report and request that my patient continue as recommended.   []  I have read the above report and request that my patient continue therapy with the following changes/special instructions:________________________________________  []I have read the above report and request that my patient be discharged from therapy.     Physician's Signature:____________Date:_________TIME:________     Paloma Fisher MD  ** Signature, Date and Time must be completed for valid certification **

## 2022-09-09 ENCOUNTER — TELEPHONE (OUTPATIENT)
Dept: PHYSICAL THERAPY | Age: 62
End: 2022-09-09

## 2022-09-09 ENCOUNTER — APPOINTMENT (OUTPATIENT)
Dept: PHYSICAL THERAPY | Age: 62
End: 2022-09-09
Attending: PHYSICAL MEDICINE & REHABILITATION
Payer: COMMERCIAL

## 2022-09-12 ENCOUNTER — PATIENT MESSAGE (OUTPATIENT)
Dept: ORTHOPEDIC SURGERY | Age: 62
End: 2022-09-12

## 2022-09-12 NOTE — TELEPHONE ENCOUNTER
From: Liliana Longo  To: Mi Person MD  Sent: 9/12/2022 8:19 AM EDT  Subject: Appointment Request    Appointment Request From: Liliana Longo    With Provider: Mi Person MD [facility]    Preferred Date Range: 9/13/2022  9/16/2022    Preferred Times: Any Time    Reason for visit: Request an Appointment    Comments:  Held for 15 minutes this morning to reschedule due to migraine overnight

## 2022-09-13 ENCOUNTER — HOSPITAL ENCOUNTER (OUTPATIENT)
Dept: PHYSICAL THERAPY | Age: 62
Discharge: HOME | End: 2022-09-13
Attending: PHYSICAL MEDICINE & REHABILITATION
Payer: COMMERCIAL

## 2022-09-13 PROCEDURE — 97535 SELF CARE MNGMENT TRAINING: CPT

## 2022-09-13 PROCEDURE — 97110 THERAPEUTIC EXERCISES: CPT

## 2022-09-13 PROCEDURE — 97112 NEUROMUSCULAR REEDUCATION: CPT

## 2022-09-13 PROCEDURE — 97530 THERAPEUTIC ACTIVITIES: CPT

## 2022-09-13 NOTE — PROGRESS NOTES
PT DAILY TREATMENT NOTE     Patient Name: Amber Joseph  Date:2022  : 1960  [x]  Patient  Verified  Payor: [address] / Plan: [address] / Product Type: Managed Care Medicaid /    In time:245  Out time:319  Total Treatment Time (min): 34  Visit #: 1 of 8    Treatment Area: Other low back pain [M54.59]    SUBJECTIVE  Pain Level (0-10 scale): 3  Any medication changes, allergies to medications, adverse drug reactions, diagnosis change, or new procedure performed?: [x] No    [] Yes (see summary sheet for update)  Subjective functional status/changes:   [] No changes reported  Patient requests desire to hold on strengthening exercises secondary to pain after last treatment session. Patient declines performance of treadmill for warmup secondary to requirement to wear mask within facility. OBJECTIVE    10 min Therapeutic Exercise:  [x] See flow sheet : Emphasis placed on improving available lumbopelvic and LE AROM and strength    Prone, T12-L1, L3 CPA Grade I-II Mobilization  Seated, TLJ Manipulation (performed on left/right)    **Manual therapy performed prior to exercise performance to improve tolerance to completion and improve efficacy of exercise performed and available pain-free lumbar AROM.     Rationale: increase ROM and increase strength to improve the patients ability to improve ease with functional ADLs     8 min Therapeutic Activity:  [x]  See flow sheet : Promotion of lumbopelvic AROM to improve ease with bed mobility and functional transfers   Rationale: increase ROM and increase strength  to improve the patients ability to improve ease with bed mobility     8 min Neuromuscular Re-education:  [x]  See flow sheet :  Emphasis placed on improving activation and recruitment of the anterior abdominal and gluteal musculature and improving LE proprioceptive and kinesthetic awareness   Rationale: increase ROM and increase strength  to improve the patients ability to improve ease with work-related ADLs       8 min Self-Care  [x]  See flow sheet :  Education re: exercise prescription with current gym regime with education re: maintenance of lordotic curve, Discussion re weight utilization with gym exercise regime   Rationale: increase ROM and increase strength  to improve the patients ability to improve ease with work-related ADLs    With   [] TE   [] TA   [] neuro   [] other: Patient Education: [x] Review HEP    [] Progressed/Changed HEP based on:   [] positioning   [] body mechanics   [] transfers   [] heat/ice application    [] other:      Other Objective/Functional Measures: TTP L3 SP     Pain Level (0-10 scale) post treatment: 0    ASSESSMENT/Changes in Function: With required modification of treatment session in accordance with patient self-request with greater emphasis placed on mobility vs. Stability and strengthening. With continued hypomobility and pain noted upon assessment of spinal mobility at the thoracolumbar junction. Patient will continue to benefit from skilled PT services to modify and progress therapeutic interventions, address functional mobility deficits, address ROM deficits, address strength deficits, analyze and address soft tissue restrictions, analyze and cue movement patterns, analyze and modify body mechanics/ergonomics, address imbalance/dizziness, and instruct in home and community integration to attain remaining goals. []  See Plan of Care  []  See progress note/recertification  []  See Discharge Summary         Progress towards goals / Updated goals:    Updated Goals: to be achieved in 4 weeks:  1. Patient will subjectively report full compliance with prescribed HEP. At PN: Progressing, HEP initiated without full compliance  Current: Remains, HEP initiated without full compliance, 9/13/2022  2. Patient will demonstrate left/right hip abduction MMT >/= 4+/5 to improve ease with long-distance ambulation.   At PN: Progressing, Left Hip Abduction MMT = 4/5, Right Hip Abduction MMT = 4/5  3. Patient will demonstrate ability to perform deadlift x10 repetitions with 85 lb without pain to enable return to previous exercise regime. At PN: Progressing, Deadlift x8 repetitions with 45 lb with right lumbar pain 7/10  4. Patient will subjectively report pain at worst in last week </= 2/10 to improve overall quality of life.   At PN: Progressing, Pain [address] (last week) = 5/10       PLAN  [x]  Upgrade activities as tolerated     [x]  Continue plan of care  []  Update interventions per flow sheet       []  Discharge due to:_  []  Other:_      Latonia Mckenzie PT 9/13/2022  7:08 AM    Future Appointments   Date Time Provider Orin Lutz   9/21/2022  3:30 PM Morris Chadwick [facility]   9/23/2022 10:15 AM Gary Shah PT [facility]   9/28/2022  8:45 AM [facility] Shah PT [facility]   10/14/2022  9:00 AM Malia Gonzalez, [address] [place] [address]   8/18/2023 11:00 AM Ross Moreno PA-C [facility]

## 2022-09-21 ENCOUNTER — HOSPITAL ENCOUNTER (OUTPATIENT)
Dept: PHYSICAL THERAPY | Age: 62
Discharge: HOME | End: 2022-09-21
Attending: PHYSICAL MEDICINE & REHABILITATION
Payer: COMMERCIAL

## 2022-09-21 PROCEDURE — 97112 NEUROMUSCULAR REEDUCATION: CPT

## 2022-09-21 PROCEDURE — 97535 SELF CARE MNGMENT TRAINING: CPT

## 2022-09-21 PROCEDURE — 97110 THERAPEUTIC EXERCISES: CPT

## 2022-09-21 PROCEDURE — 97530 THERAPEUTIC ACTIVITIES: CPT

## 2022-09-21 NOTE — PROGRESS NOTES
PT DAILY TREATMENT NOTE     Patient Name: lBair Kruger  Date:2022  : 1960  [x]  Patient  Verified  Payor: Esther  / Plan: [address] / Product Type: Managed Care Medicaid /    In time:321  Out time:355  Total Treatment Time (min): 34  Visit #: 2 of 8      Treatment Area: Other low back pain [M54.59]    SUBJECTIVE  Pain Level (0-10 scale): 1  Any medication changes, allergies to medications, adverse drug reactions, diagnosis change, or new procedure performed?: [x] No    [] Yes (see summary sheet for update)  Subjective functional status/changes:   [] No changes reported  Patient reports walking a lot this weekend with increase in lumbar pain. OBJECTIVE    10 min Therapeutic Exercise:  [x] See flow sheet : Emphasis placed on improving available lumbopelvic and LE AROM and strength     Prone, T12-L1, L3 CPA Grade I-II Mobilization  Prone, Right L3 Right UPA Grade II Mobilization     **Manual therapy performed prior to exercise performance to improve tolerance to completion and improve efficacy of exercise performed and available pain-free lumbar AROM.     Rationale: increase ROM and increase strength to improve the patients ability to improve ease with functional ADLs     8 min Therapeutic Activity:  [x]  See flow sheet : Promotion of lumbopelvic AROM to improve ease with bed mobility and functional transfers   Rationale: increase ROM and increase strength  to improve the patients ability to improve ease with bed mobility     8 min Neuromuscular Re-education:  [x]  See flow sheet :  Emphasis placed on improving activation and recruitment of the anterior abdominal and gluteal musculature and improving LE proprioceptive and kinesthetic awareness   Rationale: increase ROM and increase strength  to improve the patients ability to improve ease with work-related ADLs    8 min Self Care/Home Management:   Discussion re modifications to gym regime - Emphasis placed on LE and lumbar strengthening with maintenance of erect spine with co-activation of gluteals and abdominals    Rationale: increase strength and increase proprioception  to improve the patients ability to improve ease with gym regime. With   [] TE   [] TA   [] neuro   [] other: Patient Education: [x] Review HEP    [] Progressed/Changed HEP based on:   [] positioning   [] body mechanics   [] transfers   [] heat/ice application    [] other:      Other Objective/Functional Measures: Pain with CPA and right UPA L3     Pain Level (0-10 scale) post treatment: 0    ASSESSMENT/Changes in Function: With progression of functional strength training to enable patient to return back to gym regime with emphasis placed on gluteal strengthening with a lumbar extension bias. With continued TTP noted with application of central and right unilateral pressure to L3. Patient noted to demonstrate lumbar AROM WNL and pain-free all directions but with continuing localized TTP to L3 with application of PA pressure. Patient will continue to benefit from skilled PT services to modify and progress therapeutic interventions, address functional mobility deficits, address ROM deficits, address strength deficits, analyze and address soft tissue restrictions, analyze and cue movement patterns, analyze and modify body mechanics/ergonomics, assess and modify postural abnormalities, address imbalance/dizziness, and instruct in home and community integration to attain remaining goals. []  See Plan of Care  []  See progress note/recertification  []  See Discharge Summary         Progress towards goals / Updated goals:    Updated Goals: to be achieved in 4 weeks:  1. Patient will subjectively report full compliance with prescribed HEP. At PN: Progressing, HEP initiated without full compliance  Current: Remains, HEP initiated without full compliance, 9/13/2022  2.  Patient will demonstrate left/right hip abduction MMT >/= 4+/5 to improve ease with long-distance ambulation. At PN: Progressing, Left Hip Abduction MMT = 4/5, Right Hip Abduction MMT = 4/5  3. Patient will demonstrate ability to perform deadlift x10 repetitions with 85 lb without pain to enable return to previous exercise regime. At PN: Progressing, Deadlift x8 repetitions with 45 lb with right lumbar pain 7/10  4. Patient will subjectively report pain at worst in last week </= 2/10 to improve overall quality of life.   At PN: Progressing, Pain at [address] (last week) = 5/10  Current: Remains,  Pain at [address] (last week) = 5/10,9/21/2022    PLAN  [x]  Upgrade activities as tolerated     [x]  Continue plan of care  []  Update interventions per flow sheet       []  Discharge due to:_  []  Other:_      Bessy Gould PT 9/21/2022  8:30 AM    Future Appointments   Date Time Provider Orin Lutz   9/21/2022  3:30 PM Morris Rivero [facility]   9/23/2022 10:15 AM Abel Whaley PT [facility]   9/28/2022  8:45 AM BONG Rivero [facility]   10/14/2022  9:00 AM [place] [address]   8/18/2023 11:00 AM Girish Song PA-C [address]

## 2022-09-23 ENCOUNTER — HOSPITAL ENCOUNTER (OUTPATIENT)
Dept: PHYSICAL THERAPY | Age: 62
End: 2022-09-23
Attending: PHYSICAL MEDICINE & REHABILITATION
Payer: COMMERCIAL

## 2022-09-23 ENCOUNTER — TELEPHONE (OUTPATIENT)
Dept: PHYSICAL THERAPY | Age: 62
End: 2022-09-23

## 2022-09-28 ENCOUNTER — TELEPHONE (OUTPATIENT)
Dept: PHYSICAL THERAPY | Age: 62
End: 2022-09-28

## 2022-09-28 ENCOUNTER — HOSPITAL ENCOUNTER (OUTPATIENT)
Dept: PHYSICAL THERAPY | Age: 62
Discharge: HOME | End: 2022-09-28
Attending: PHYSICAL MEDICINE & REHABILITATION
Payer: COMMERCIAL

## 2022-09-28 PROCEDURE — 97535 SELF CARE MNGMENT TRAINING: CPT

## 2022-09-28 PROCEDURE — 97530 THERAPEUTIC ACTIVITIES: CPT

## 2022-09-28 PROCEDURE — 97112 NEUROMUSCULAR REEDUCATION: CPT

## 2022-09-28 NOTE — PROGRESS NOTES
[facility] - [facility]              [address] [facility] [place], [address] [place]   [phone] fax    Progress Note  Patient name: Luis Cedillo Start of Care: 2022   Referral source: Mason Ribera MD : 1960   Medical/Treatment Diagnosis: Other low back pain [M54.59]  [facility] / Plan: [address] / Product Type: Managed Care Medicaid /  Onset Date:2022     Prior Hospitalization: see medical history Provider#: [id number]   Medications: Verified on Patient Summary List    Comorbidities: TIA (), Migraine HA, 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 Patient will subjectively report full compliance with prescribed HEP. At PN: Progressing, HEP initiated without full compliance  Current: Remains, HEP initiated without full compliance  2. Patient will demonstrate left/right hip abduction MMT >/= 4+/5 to improve ease with long-distance ambulation. At PN: Progressing, Left Hip Abduction MMT = 4/5, Right Hip Abduction MMT = 4/5  Current: Met, Left Hip Abduction MMT = 5/5, Right Hip Abduction MMT = 5/5  3. Patient will demonstrate ability to perform deadlift x10 repetitions with 85 lb without pain to enable return to previous exercise regime. At PN: Progressing, Deadlift x8 repetitions with 45 lb with right lumbar pain 7/10  Current: Remains, Deadlift x8 repetitions with 45 lb with right lumbar pain 7/10,  4. Patient will subjectively report pain at worst in last week </= 2/10 to improve overall quality of life.   At PN: Progressing, Pain at Worst (last week) = 5/10  Current: Progressing,  Pain at Worst (last week) = 3-4/10     Key Functional Changes: See goals above. Updated Goals: to be achieved in 4 weeks:  1. Patient will subjectively report full compliance with prescribed HEP. At PN: Remains, HEP initiated without full compliance  2. Patient will demonstrate ability to perform deadlift x10 repetitions with 85 lb without pain to enable return to previous exercise regime. At PN: Remains, Deadlift x8 repetitions with 45 lb with right lumbar pain 7/10,  3. Patient will subjectively report pain at worst in last week </= 2/10 to improve overall quality of life. At PN: Progressing,  [address] (last week) = 3-4/10    ASSESSMENT/RECOMMENDATIONS:    At this time to place patient on 30 day hold with referral back to physician secondary to lack of appreciable progress towards created therapeutic goals. Patient continues to demonstrate isolated TTP to the L3 spinous process and right transverse process with (+) jump response with mobility assessment and without improvement since start of care. With (-) heel drop test, neutral spine and extended. Patient as well endorses elevated inflammatory markers with completion of routine blood work by oncologist due to [address] significant for thyroid/lymph node cancer (10/2021). Subjectively patient with ability to perform extension-biased exercises, back barbell squat, seated mid-row, and seated latissimus pull-down, without symptom provocation but with continued provocation of symptoms with loaded lumbar spine flexion. Patient educated to schedule follow up appointment with MD with clinician to await MD recommendation regarding continuation of care.      Patient will continue to benefit from skilled PT services to modify and progress therapeutic interventions, address functional mobility deficits, address ROM deficits, address strength deficits, analyze and address soft tissue restrictions, analyze and cue movement patterns, analyze and modify body mechanics/ergonomics, assess and modify postural abnormalities, address imbalance/dizziness, and instruct in home and community integration to attain remaining goals. [x]With referral back to physician secondary to lack of appreciable progress towards created therapeutic goals. If determination made to continue with care, to continue therapy per initial plan/protocol at a frequency of  1-2 x per week for 4 weeks  []Continue therapy with the following recommended changes:_____________________      _____________________________________________________________________  []Discontinue therapy progressing towards or have reached established goals  []Discontinue therapy due to lack of appreciable progress towards goals  []Discontinue therapy due to lack of attendance or compliance  [x]Await Physician's recommendations/decisions regarding therapy  []Other:________________________________________________________________    Thank you for this referral.    Irena Edwards, PT 2022 9:06 AM  NOTE TO PHYSICIAN:  PLEASE COMPLETE THE ORDERS BELOW AND   FAX TO [facility]: [phone]  If you are unable to process this request in 24 hours please contact our office: [phone]    []  I have read the above report and request that my patient continue as recommended. []  I have read the above report and request that my patient continue therapy with the following changes/special instructions:________________________________________  []I have read the above report and request that my patient be discharged from therapy.     Physician's Signature:____________Date:_________TIME:________     Henrene Apgar, MD  ** Signature, Date and Time must be completed for valid certification **

## 2022-09-28 NOTE — PROGRESS NOTES
PT DAILY TREATMENT NOTE     Patient Name: Regulo Vega  Date:2022  : 1960  [x]  Patient  Verified  Payor: Esther 22 / Plan: [address] / Product Type: Managed Care Medicaid /    In time:840  Out time:900  Total Treatment Time (min): 20  Visit #: 3 of 8    Treatment Area: Other low back pain [M54.59]    SUBJECTIVE  Pain Level (0-10 scale): 1  Any medication changes, allergies to medications, adverse drug reactions, diagnosis change, or new procedure performed?: [x] No    [] Yes (see summary sheet for update)  Subjective functional status/changes:   [] No changes reported  Patient reports after last treatment session noting some increase in chronic cervical and bilateral UE symptoms with patient questioning contribution to the increase in weight utilized. OBJECTIVE    12 min Therapeutic Activity:  [x]  See flow sheet :Re-assessment, Review of prescribed HEP, Patient education re: follow up with MD for further medical evaluation   Rationale: increase ROM and increase strength  to improve the patients ability to improve ease with bed mobility     8 min Self Care/Home Management:   Discussion re modifications to gym regime - Emphasis placed on LE and lumbar strengthening with maintenance of erect spine with co-activation of gluteals and abdominals    Rationale: increase strength and increase proprioception  to improve the patients ability to improve ease with gym regime. With   [] TE   [] TA   [] neuro   [] other: Patient Education: [x] Review HEP    [] Progressed/Changed HEP based on:   [] positioning   [] body mechanics   [] transfers   [] heat/ice application    [] other:      Other Objective/Functional Measures: See goals below. Pain Level (0-10 scale) post treatment: 1    ASSESSMENT/Changes in Function:  At this time to place patient on 30 day hold with referral back to physician secondary to lack of appreciable progress towards created therapeutic goals. Patient continues to demonstrate isolated TTP to the L3 spinous process and right transverse process with (+) jump response with mobility assessment and without improvement since start of care. With (-) heel drop test, neutral spine and extended. Subjectively patient with ability to perform extension-biased exercises, back barbell squat, seated mid-row, and seated latissimus pull-down, without symptom provocation but with continued provocation of symptoms with loaded lumbar spine flexion. Patient educated to schedule follow up appointment with MD with clinician to await MD recommendation regarding continuation of care. Patient will continue to benefit from skilled PT services to modify and progress therapeutic interventions, address functional mobility deficits, address ROM deficits, address strength deficits, analyze and address soft tissue restrictions, analyze and cue movement patterns, analyze and modify body mechanics/ergonomics, assess and modify postural abnormalities, address imbalance/dizziness, and instruct in home and community integration to attain remaining goals. []  See Plan of Care  [x]  See progress note/recertification  []  See Discharge Summary         Progress towards goals / Updated goals:    Updated Goals: to be achieved in 4 weeks:  1. Patient will subjectively report full compliance with prescribed HEP. At PN: Progressing, HEP initiated without full compliance  Current: Remains, HEP initiated without full compliance, 9/13/2022  2. Patient will demonstrate left/right hip abduction MMT >/= 4+/5 to improve ease with long-distance ambulation. At PN: Progressing, Left Hip Abduction MMT = 4/5, Right Hip Abduction MMT = 4/5  Current: Met, Left Hip Abduction MMT = 5/5, Right Hip Abduction MMT = 5/5, 9/28/2022  3. Patient will demonstrate ability to perform deadlift x10 repetitions with 85 lb without pain to enable return to previous exercise regime.   At PN: Progressing, Deadlift x8 repetitions with 45 lb with right lumbar pain 7/10  Current: Remains, Deadlift x8 repetitions with 45 lb with right lumbar pain 7/10, 9/28/2022  4. Patient will subjectively report pain at worst in last week </= 2/10 to improve overall quality of life.   At PN: Progressing, Pain at [address] (last week) = 5/10  Current: Progressing,  Pain at [address] (last week) = 3-4/10,9/28/2022    PLAN  []  Upgrade activities as tolerated     []  Continue plan of care  []  Update interventions per flow sheet       []  Discharge due to:_  [x]  Other:_  Hold x30 days with referral to MD Miri Ye, PT 9/28/2022  8:15 AM    Future Appointments   Date Time Provider Orin Lutz   9/28/2022  8:45 AM Rachael Riley, PT [facility]   10/14/2022  9:00 AM , [address] [place] [address]   8/18/2023 11:00 AM Kermitt Schlatter, PA-C [place]

## 2022-10-25 ENCOUNTER — TELEPHONE (OUTPATIENT)
Dept: PHYSICAL THERAPY | Age: 62
End: 2022-10-25

## 2022-11-08 NOTE — PROGRESS NOTES
[facility]              [address]        [place], [address]   [phone] fax    Discharge Summary  Patient name: Vilma Lino Start of Care: 2022   Referral source: Henrene Apgar, MD : 1960   Medical/Treatment Diagnosis: Other low back pain [M54.59]  [facility]  / Plan: [address] / Product Type: Managed Care Medicaid /  Onset Date:2022     Prior Hospitalization: see medical history Provider#: [id number]   Medications: Verified on Patient Summary List    Comorbidities: TIA (), Migraine HA, 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 Care: 5    Missed Visits: 1  Reporting Period : 2022 to 2022    Summary of Care:    Updated Goals: to be achieved in 4 weeks:  1. Patient will subjectively report full compliance with prescribed HEP. At PN: Remains, HEP initiated without full compliance  At DC: Remains, Inability to assess secondary to non-return to clinic  2. Patient will demonstrate ability to perform deadlift x10 repetitions with 85 lb without pain to enable return to previous exercise regime. At PN: Remains, Deadlift x8 repetitions with 45 lb with right lumbar pain 7/10,  At DC: Remains, Inability to assess secondary to non-return to clinic  3. Patient will subjectively report pain at worst in last week </= 2/10 to improve overall quality of life.   At PN: Progressing,  Pain at [address] (last week) = 3-4/10  At DC: Remains, Inability to assess secondary to non-return to clinic    ASSESSMENT/RECOMMENDATIONS:    At this time patient to be discharged in accordance with clinic -[facility] policy with patient having not been seen within clinic since 2022. At time of last reassessment with placement on 30 day hold with referral back to physician secondary to lack of appreciable progress towards created therapeutic goals. Patient without further contact with clinic over 30 day hold period. [x]Discontinue therapy: []Patient has reached or is progressing toward set goals      []Patient is non-compliant or has abdicated      [x]Due to lack of appreciable progress towards set goals    Irena Edwards, PT 2022 9:26 AM    NOTE TO PHYSICIAN:  Please complete the following and fax to: [facility] at [facility] at [phone]  . Retain this original for your records. If you are unable to process this request in   24 hours, please contact our office.      [] I have read the above report and request that my patient continue therapy with the following changes/special instructions:  [] I have read the above report and request that my patient be discharged from therapy    Physician's Signature:____________Date:_________TIME:________     Henrene Apgar, MD  ** Signature, Date and Time must be completed for valid certification **

## 2023-01-09 ENCOUNTER — TELEPHONE (OUTPATIENT)
Dept: FAMILY MEDICINE CLINIC | Age: 63
End: 2023-01-09

## 2023-01-09 NOTE — TELEPHONE ENCOUNTER
Patient called and has not been seen since last year CT Scan revealed three artery CAD. Told to see PCP to get EKG and Echo by his ENT  at [facility] . I offered him an appointment with Tiffany which he declined.

## 2024-11-19 NOTE — TELEPHONE ENCOUNTER
Left message on voicemail for patient to return my call Rituxan Pregnancy And Lactation Text: This medication is Pregnancy Category C and it isn't know if it is safe during pregnancy. It is unknown if this medication is excreted in breast milk but similar antibodies are known to be excreted. Oral Minoxidil Pregnancy And Lactation Text: This medication should only be used when clearly needed if you are pregnant, attempting to become pregnant or breast feeding. Cellcept Counseling:  I discussed with the patient the risks of mycophenolate mofetil including but not limited to infection/immunosuppression, GI upset, hypokalemia, hypercholesterolemia, bone marrow suppression, lymphoproliferative disorders, malignancy, GI ulceration/bleed/perforation, colitis, interstitial lung disease, kidney failure, progressive multifocal leukoencephalopathy, and birth defects.  The patient understands that monitoring is required including a baseline creatinine and regular CBC testing. In addition, patient must alert us immediately if symptoms of infection or other concerning signs are noted. Quinolones Pregnancy And Lactation Text: This medication is Pregnancy Category C and it isn't know if it is safe during pregnancy. It is also excreted in breast milk. Opioid Counseling: I discussed with the patient the potential side effects of opioids including but not limited to addiction, altered mental status, and depression. I stressed avoiding alcohol, benzodiazepines, muscle relaxants and sleep aids unless specifically okayed by a physician. The patient verbalized understanding of the proper use and possible adverse effects of opioids. All of the patient's questions and concerns were addressed. They were instructed to flush the remaining pills down the toilet if they did not need them for pain. Solaraze Counseling:  I discussed with the patient the risks of Solaraze including but not limited to erythema, scaling, itching, weeping, crusting, and pain. Imiquimod Counseling:  I discussed with the patient the risks of imiquimod including but not limited to erythema, scaling, itching, weeping, crusting, and pain.  Patient understands that the inflammatory response to imiquimod is variable from person to person and was educated regarded proper titration schedule.  If flu-like symptoms develop, patient knows to discontinue the medication and contact us. High Dose Vitamin A Counseling: Side effects reviewed, pt to contact office should one occur. Simponi Counseling:  I discussed with the patient the risks of golimumab including but not limited to myelosuppression, immunosuppression, autoimmune hepatitis, demyelinating diseases, lymphoma, and serious infections.  The patient understands that monitoring is required including a PPD at baseline and must alert us or the primary physician if symptoms of infection or other concerning signs are noted. Cyclosporine Pregnancy And Lactation Text: This medication is Pregnancy Category C and it isn't know if it is safe during pregnancy. This medication is excreted in breast milk. Erivedge Counseling- I discussed with the patient the risks of Erivedge including but not limited to nausea, vomiting, diarrhea, constipation, weight loss, changes in the sense of taste, decreased appetite, muscle spasms, and hair loss.  The patient verbalized understanding of the proper use and possible adverse effects of Erivedge.  All of the patient's questions and concerns were addressed. Ilumya Pregnancy And Lactation Text: The risk during pregnancy and breastfeeding is uncertain with this medication. Tranexamic Acid Counseling:  Patient advised of the small risk of bleeding problems with tranexamic acid. They were also instructed to call if they developed any nausea, vomiting or diarrhea. All of the patient's questions and concerns were addressed. Drysol Counseling:  I discussed with the patient the risks of drysol/aluminum chloride including but not limited to skin rash, itching, irritation, burning. 5-Fu Pregnancy And Lactation Text: This medication is Pregnancy Category X and contraindicated in pregnancy and in women who may become pregnant. It is unknown if this medication is excreted in breast milk. Tazorac Pregnancy And Lactation Text: This medication is not safe during pregnancy. It is unknown if this medication is excreted in breast milk. Eucrisa Counseling: Patient may experience a mild burning sensation during topical application. Eucrisa is not approved in children less than 2 years of age. Isotretinoin Pregnancy And Lactation Text: This medication is Pregnancy Category X and is considered extremely dangerous during pregnancy. It is unknown if it is excreted in breast milk. Winlevi Counseling:  I discussed with the patient the risks of topical clascoterone including but not limited to erythema, scaling, itching, and stinging. Patient voiced their understanding. Valtrex Pregnancy And Lactation Text: this medication is Pregnancy Category B and is considered safe during pregnancy. This medication is not directly found in breast milk but it's metabolite acyclovir is present. Otezla Counseling: The side effects of Otezla were discussed with the patient, including but not limited to worsening or new depression, weight loss, diarrhea, nausea, upper respiratory tract infection, and headache. Patient instructed to call the office should any adverse effect occur.  The patient verbalized understanding of the proper use and possible adverse effects of Otezla.  All the patient's questions and concerns were addressed. Gabapentin Counseling: I discussed with the patient the risks of gabapentin including but not limited to dizziness, somnolence, fatigue and ataxia. Topical Sulfur Applications Counseling: Topical Sulfur Counseling: Patient counseled that this medication may cause skin irritation or allergic reactions.  In the event of skin irritation, the patient was advised to reduce the amount of the drug applied or use it less frequently.   The patient verbalized understanding of the proper use and possible adverse effects of topical sulfur application.  All of the patient's questions and concerns were addressed. Colchicine Pregnancy And Lactation Text: This medication is Pregnancy Category C and isn't considered safe during pregnancy. It is excreted in breast milk. Tetracycline Counseling: Patient counseled regarding possible photosensitivity and increased risk for sunburn.  Patient instructed to avoid sunlight, if possible.  When exposed to sunlight, patients should wear protective clothing, sunglasses, and sunscreen.  The patient was instructed to call the office immediately if the following severe adverse effects occur:  hearing changes, easy bruising/bleeding, severe headache, or vision changes.  The patient verbalized understanding of the proper use and possible adverse effects of tetracycline.  All of the patient's questions and concerns were addressed. Patient understands to avoid pregnancy while on therapy due to potential birth defects. Zyclara Pregnancy And Lactation Text: This medication is Pregnancy Category C. It is unknown if this medication is excreted in breast milk. Otezla Pregnancy And Lactation Text: This medication is Pregnancy Category C and it isn't known if it is safe during pregnancy. It is unknown if it is excreted in breast milk. Protopic Pregnancy And Lactation Text: This medication is Pregnancy Category C. It is unknown if this medication is excreted in breast milk when applied topically. Quinolones Counseling:  I discussed with the patient the risks of fluoroquinolones including but not limited to GI upset, allergic reaction, drug rash, diarrhea, dizziness, photosensitivity, yeast infections, liver function test abnormalities, tendonitis/tendon rupture. Hydroquinone Counseling:  Patient advised that medication may result in skin irritation, lightening (hypopigmentation), dryness, and burning.  In the event of skin irritation, the patient was advised to reduce the amount of the drug applied or use it less frequently.  Rarely, spots that are treated with hydroquinone can become darker (pseudoochronosis).  Should this occur, patient instructed to stop medication and call the office. The patient verbalized understanding of the proper use and possible adverse effects of hydroquinone.  All of the patient's questions and concerns were addressed. Humira Pregnancy And Lactation Text: This medication is Pregnancy Category B and is considered safe during pregnancy. It is unknown if this medication is excreted in breast milk. Thalidomide Counseling: I discussed with the patient the risks of thalidomide including but not limited to birth defects, anxiety, weakness, chest pain, dizziness, cough and severe allergy. Drysol Pregnancy And Lactation Text: This medication is considered safe during pregnancy and breast feeding. Tranexamic Acid Pregnancy And Lactation Text: It is unknown if this medication is safe during pregnancy or breast feeding. Fluconazole Counseling:  Patient counseled regarding adverse effects of fluconazole including but not limited to headache, diarrhea, nausea, upset stomach, liver function test abnormalities, taste disturbance, and stomach pain.  There is a rare possibility of liver failure that can occur when taking fluconazole.  The patient understands that monitoring of LFTs and kidney function test may be required, especially at baseline. The patient verbalized understanding of the proper use and possible adverse effects of fluconazole.  All of the patient's questions and concerns were addressed. Benzoyl Peroxide Pregnancy And Lactation Text: This medication is Pregnancy Category C. It is unknown if benzoyl peroxide is excreted in breast milk. Erythromycin Counseling:  I discussed with the patient the risks of erythromycin including but not limited to GI upset, allergic reaction, drug rash, diarrhea, increase in liver enzymes, and yeast infections. Itraconazole Counseling:  I discussed with the patient the risks of itraconazole including but not limited to liver damage, nausea/vomiting, neuropathy, and severe allergy.  The patient understands that this medication is best absorbed when taken with acidic beverages such as non-diet cola or ginger ale.  The patient understands that monitoring is required including baseline LFTs and repeat LFTs at intervals.  The patient understands that they are to contact us or the primary physician if concerning signs are noted. Minocycline Pregnancy And Lactation Text: This medication is Pregnancy Category D and not consider safe during pregnancy. It is also excreted in breast milk. Minocycline Counseling: Patient advised regarding possible photosensitivity and discoloration of the teeth, skin, lips, tongue and gums.  Patient instructed to avoid sunlight, if possible.  When exposed to sunlight, patients should wear protective clothing, sunglasses, and sunscreen.  The patient was instructed to call the office immediately if the following severe adverse effects occur:  hearing changes, easy bruising/bleeding, severe headache, or vision changes.  The patient verbalized understanding of the proper use and possible adverse effects of minocycline.  All of the patient's questions and concerns were addressed. Nsaids Pregnancy And Lactation Text: These medications are considered safe up to 30 weeks gestation. It is excreted in breast milk. Topical Ketoconazole Counseling: Patient counseled that this medication may cause skin irritation or allergic reactions.  In the event of skin irritation, the patient was advised to reduce the amount of the drug applied or use it less frequently.   The patient verbalized understanding of the proper use and possible adverse effects of ketoconazole.  All of the patient's questions and concerns were addressed. Ketoconazole Pregnancy And Lactation Text: This medication is Pregnancy Category C and it isn't know if it is safe during pregnancy. It is also excreted in breast milk and breast feeding isn't recommended. Finasteride Male Counseling: Finasteride Counseling:  I discussed with the patient the risks of use of finasteride including but not limited to decreased libido, decreased ejaculate volume, gynecomastia, and depression. Women should not handle medication.  All of the patient's questions and concerns were addressed. Cyclophosphamide Pregnancy And Lactation Text: This medication is Pregnancy Category D and it isn't considered safe during pregnancy. This medication is excreted in breast milk. Zyclara Counseling:  I discussed with the patient the risks of imiquimod including but not limited to erythema, scaling, itching, weeping, crusting, and pain.  Patient understands that the inflammatory response to imiquimod is variable from person to person and was educated regarded proper titration schedule.  If flu-like symptoms develop, patient knows to discontinue the medication and contact us. Stelara Counseling:  I discussed with the patient the risks of ustekinumab including but not limited to immunosuppression, malignancy, posterior leukoencephalopathy syndrome, and serious infections.  The patient understands that monitoring is required including a PPD at baseline and must alert us or the primary physician if symptoms of infection or other concerning signs are noted. Ivermectin Counseling:  Patient instructed to take medication on an empty stomach with a full glass of water.  Patient informed of potential adverse effects including but not limited to nausea, diarrhea, dizziness, itching, and swelling of the extremities or lymph nodes.  The patient verbalized understanding of the proper use and possible adverse effects of ivermectin.  All of the patient's questions and concerns were addressed. Rifampin Pregnancy And Lactation Text: This medication is Pregnancy Category C and it isn't know if it is safe during pregnancy. It is also excreted in breast milk and should not be used if you are breast feeding. Doxepin Counseling:  Patient advised that the medication is sedating and not to drive a car after taking this medication. Patient informed of potential adverse effects including but not limited to dry mouth, urinary retention, and blurry vision.  The patient verbalized understanding of the proper use and possible adverse effects of doxepin.  All of the patient's questions and concerns were addressed. Cimzia Pregnancy And Lactation Text: This medication crosses the placenta but can be considered safe in certain situations. Cimzia may be excreted in breast milk. Propranolol Counseling:  I discussed with the patient the risks of propranolol including but not limited to low heart rate, low blood pressure, low blood sugar, restlessness and increased cold sensitivity. They should call the office if they experience any of these side effects. Glycopyrrolate Counseling:  I discussed with the patient the risks of glycopyrrolate including but not limited to skin rash, drowsiness, dry mouth, difficulty urinating, and blurred vision. Xolair Counseling:  Patient informed of potential adverse effects including but not limited to fever, muscle aches, rash and allergic reactions.  The patient verbalized understanding of the proper use and possible adverse effects of Xolair.  All of the patient's questions and concerns were addressed. Topical Clindamycin Pregnancy And Lactation Text: This medication is Pregnancy Category B and is considered safe during pregnancy. It is unknown if it is excreted in breast milk. Carac Counseling:  I discussed with the patient the risks of Carac including but not limited to erythema, scaling, itching, weeping, crusting, and pain. Benzoyl Peroxide Counseling: Patient counseled that medicine may cause skin irritation and bleach clothing.  In the event of skin irritation, the patient was advised to reduce the amount of the drug applied or use it less frequently.   The patient verbalized understanding of the proper use and possible adverse effects of benzoyl peroxide.  All of the patient's questions and concerns were addressed. Tremfya Counseling: I discussed with the patient the risks of guselkumab including but not limited to immunosuppression, serious infections, worsening of inflammatory bowel disease and drug reactions.  The patient understands that monitoring is required including a PPD at baseline and must alert us or the primary physician if symptoms of infection or other concerning signs are noted. Metronidazole Pregnancy And Lactation Text: This medication is Pregnancy Category B and considered safe during pregnancy.  It is also excreted in breast milk. Propranolol Pregnancy And Lactation Text: This medication is Pregnancy Category C and it isn't known if it is safe during pregnancy. It is excreted in breast milk. Xolair Pregnancy And Lactation Text: This medication is Pregnancy Category B and is considered safe during pregnancy. This medication is excreted in breast milk. Protopic Counseling: Patient may experience a mild burning sensation during topical application. Protopic is not approved in children less than 2 years of age. There have been case reports of hematologic and skin malignancies in patients using topical calcineurin inhibitors although causality is questionable. Hydroxychloroquine Counseling:  I discussed with the patient that a baseline ophthalmologic exam is needed at the start of therapy and every year thereafter while on therapy. A CBC may also be warranted for monitoring.  The side effects of this medication were discussed with the patient, including but not limited to agranulocytosis, aplastic anemia, seizures, rashes, retinopathy, and liver toxicity. Patient instructed to call the office should any adverse effect occur.  The patient verbalized understanding of the proper use and possible adverse effects of Plaquenil.  All the patient's questions and concerns were addressed. Erythromycin Pregnancy And Lactation Text: This medication is Pregnancy Category B and is considered safe during pregnancy. It is also excreted in breast milk. Elidel Counseling: Patient may experience a mild burning sensation during topical application. Elidel is not approved in children less than 2 years of age. There have been case reports of hematologic and skin malignancies in patients using topical calcineurin inhibitors although causality is questionable. Oral Minoxidil Counseling- I discussed with the patient the risks of oral minoxidil including but not limited to shortness of breath, swelling of the feet or ankles, dizziness, lightheadedness, unwanted hair growth and allergic reaction.  The patient verbalized understanding of the proper use and possible adverse effects of oral minoxidil.  All of the patient's questions and concerns were addressed. Birth Control Pills Pregnancy And Lactation Text: This medication should be avoided if pregnant and for the first 30 days post-partum. Griseofulvin Counseling:  I discussed with the patient the risks of griseofulvin including but not limited to photosensitivity, cytopenia, liver damage, nausea/vomiting and severe allergy.  The patient understands that this medication is best absorbed when taken with a fatty meal (e.g., ice cream or french fries). Topical Sulfur Applications Pregnancy And Lactation Text: This medication is Pregnancy Category C and has an unknown safety profile during pregnancy. It is unknown if this topical medication is excreted in breast milk. Libtayo Pregnancy And Lactation Text: This medication is contraindicated in pregnancy and when breast feeding. Terbinafine Counseling: Patient counseling regarding adverse effects of terbinafine including but not limited to headache, diarrhea, rash, upset stomach, liver function test abnormalities, itching, taste/smell disturbance, nausea, abdominal pain, and flatulence.  There is a rare possibility of liver failure that can occur when taking terbinafine.  The patient understands that a baseline LFT and kidney function test may be required. The patient verbalized understanding of the proper use and possible adverse effects of terbinafine.  All of the patient's questions and concerns were addressed. Xeldavinz Pregnancy And Lactation Text: This medication is Pregnancy Category D and is not considered safe during pregnancy.  The risk during breast feeding is also uncertain. Doxycycline Pregnancy And Lactation Text: This medication is Pregnancy Category D and not consider safe during pregnancy. It is also excreted in breast milk but is considered safe for shorter treatment courses. Niacinamide Counseling: I recommended taking niacin or niacinamide, also know as vitamin B3, twice daily. Recent evidence suggests that taking vitamin B3 (500 mg twice daily) can reduce the risk of actinic keratoses and non-melanoma skin cancers. Side effects of vitamin B3 include flushing and headache. Arava Counseling:  Patient counseled regarding adverse effects of Arava including but not limited to nausea, vomiting, abnormalities in liver function tests. Patients may develop mouth sores, rash, diarrhea, and abnormalities in blood counts. The patient understands that monitoring is required including LFTs and blood counts.  There is a rare possibility of scarring of the liver and lung problems that can occur when taking methotrexate. Persistent nausea, loss of appetite, pale stools, dark urine, cough, and shortness of breath should be reported immediately. Patient advised to discontinue Arava treatment and consult with a physician prior to attempting conception. The patient will have to undergo a treatment to eliminate Arava from the body prior to conception. Odomzo Pregnancy And Lactation Text: This medication is Pregnancy Category X and is absolutely contraindicated during pregnancy. It is unknown if it is excreted in breast milk. Azithromycin Counseling:  I discussed with the patient the risks of azithromycin including but not limited to GI upset, allergic reaction, drug rash, diarrhea, and yeast infections. Cyclophosphamide Counseling:  I discussed with the patient the risks of cyclophosphamide including but not limited to hair loss, hormonal abnormalities, decreased fertility, abdominal pain, diarrhea, nausea and vomiting, bone marrow suppression and infection. The patient understands that monitoring is required while taking this medication. Rituxan Counseling:  I discussed with the patient the risks of Rituxan infusions. Side effects can include infusion reactions, severe drug rashes including mucocutaneous reactions, reactivation of latent hepatitis and other infections and rarely progressive multifocal leukoencephalopathy.  All of the patient's questions and concerns were addressed. Solaraze Pregnancy And Lactation Text: This medication is Pregnancy Category B and is considered safe. There is some data to suggest avoiding during the third trimester. It is unknown if this medication is excreted in breast milk. Niacinamide Pregnancy And Lactation Text: These medications are considered safe during pregnancy. Doxycycline Counseling:  Patient counseled regarding possible photosensitivity and increased risk for sunburn.  Patient instructed to avoid sunlight, if possible.  When exposed to sunlight, patients should wear protective clothing, sunglasses, and sunscreen.  The patient was instructed to call the office immediately if the following severe adverse effects occur:  hearing changes, easy bruising/bleeding, severe headache, or vision changes.  The patient verbalized understanding of the proper use and possible adverse effects of doxycycline.  All of the patient's questions and concerns were addressed. Oxybutynin Counseling:  I discussed with the patient the risks of oxybutynin including but not limited to skin rash, drowsiness, dry mouth, difficulty urinating, and blurred vision. Clindamycin Counseling: I counseled the patient regarding use of clindamycin as an antibiotic for prophylactic and/or therapeutic purposes. Clindamycin is active against numerous classes of bacteria, including skin bacteria. Side effects may include nausea, diarrhea, gastrointestinal upset, rash, hives, yeast infections, and in rare cases, colitis. Birth Control Pills Counseling: Birth Control Pill Counseling: I discussed with the patient the potential side effects of OCPs including but not limited to increased risk of stroke, heart attack, thrombophlebitis, deep venous thrombosis, hepatic adenomas, breast changes, GI upset, headaches, and depression.  The patient verbalized understanding of the proper use and possible adverse effects of OCPs. All of the patient's questions and concerns were addressed. Include Pregnancy/Lactation Warning?: No Ivermectin Pregnancy And Lactation Text: This medication is Pregnancy Category C and it isn't known if it is safe during pregnancy. It is also excreted in breast milk. Valtrex Counseling: I discussed with the patient the risks of valacyclovir including but not limited to kidney damage, nausea, vomiting and severe allergy.  The patient understands that if the infection seems to be worsening or is not improving, they are to call. Dutasteride Male Counseling: Dustasteride Counseling:  I discussed with the patient the risks of use of dutasteride including but not limited to decreased libido, decreased ejaculate volume, and gynecomastia. Women who can become pregnant should not handle medication.  All of the patient's questions and concerns were addressed. Bactrim Pregnancy And Lactation Text: This medication is Pregnancy Category D and is known to cause fetal risk.  It is also excreted in breast milk. Rhofade Counseling: Rhofade is a topical medication which can decrease superficial blood flow where applied. Side effects are uncommon and include stinging, redness and allergic reactions. Hydroquinone Pregnancy And Lactation Text: This medication has not been assigned a Pregnancy Risk Category but animal studies failed to show danger with the topical medication. It is unknown if the medication is excreted in breast milk. Bactrim Counseling:  I discussed with the patient the risks of sulfa antibiotics including but not limited to GI upset, allergic reaction, drug rash, diarrhea, dizziness, photosensitivity, and yeast infections.  Rarely, more serious reactions can occur including but not limited to aplastic anemia, agranulocytosis, methemoglobinemia, blood dyscrasias, liver or kidney failure, lung infiltrates or desquamative/blistering drug rashes. 5-Fu Counseling: 5-Fluorouracil Counseling:  I discussed with the patient the risks of 5-fluorouracil including but not limited to erythema, scaling, itching, weeping, crusting, and pain. Xeljanz Counseling: I discussed with the patient the risks of Xeljanz therapy including increased risk of infection, liver issues, headache, diarrhea, or cold symptoms. Live vaccines should be avoided. They were instructed to call if they have any problems. Azelaic Acid Counseling: Patient counseled that medicine may cause skin irritation and to avoid applying near the eyes.  In the event of skin irritation, the patient was advised to reduce the amount of the drug applied or use it less frequently.   The patient verbalized understanding of the proper use and possible adverse effects of azelaic acid.  All of the patient's questions and concerns were addressed. Spironolactone Counseling: Patient advised regarding risks of diarrhea, abdominal pain, hyperkalemia, birth defects (for female patients), liver toxicity and renal toxicity. The patient may need blood work to monitor liver and kidney function and potassium levels while on therapy. The patient verbalized understanding of the proper use and possible adverse effects of spironolactone.  All of the patient's questions and concerns were addressed. Dapsone Pregnancy And Lactation Text: This medication is Pregnancy Category C and is not considered safe during pregnancy or breast feeding. Calcipotriene Pregnancy And Lactation Text: This medication has not been proven safe during pregnancy. It is unknown if this medication is excreted in breast milk. Azithromycin Pregnancy And Lactation Text: This medication is considered safe during pregnancy and is also secreted in breast milk. Wartpeel Counseling:  I discussed with the patient the risks of Wartpeel including but not limited to erythema, scaling, itching, weeping, crusting, and pain. Mirvaso Counseling: Mirvaso is a topical medication which can decrease superficial blood flow where applied. Side effects are uncommon and include stinging, redness and allergic reactions. Colchicine Counseling:  Patient counseled regarding adverse effects including but not limited to stomach upset (nausea, vomiting, stomach pain, or diarrhea).  Patient instructed to limit alcohol consumption while taking this medication.  Colchicine may reduce blood counts especially with prolonged use.  The patient understands that monitoring of kidney function and blood counts may be required, especially at baseline. The patient verbalized understanding of the proper use and possible adverse effects of colchicine.  All of the patient's questions and concerns were addressed. Bexarotene Pregnancy And Lactation Text: This medication is Pregnancy Category X and should not be given to women who are pregnant or may become pregnant. This medication should not be used if you are breast feeding. Dutasteride Pregnancy And Lactation Text: This medication is absolutely contraindicated in women, especially during pregnancy and breast feeding. Feminization of male fetuses is possible if taking while pregnant. Cosentyx Counseling:  I discussed with the patient the risks of Cosentyx including but not limited to worsening of Crohn's disease, immunosuppression, allergic reactions and infections.  The patient understands that monitoring is required including a PPD at baseline and must alert us or the primary physician if symptoms of infection or other concerning signs are noted. Rifampin Counseling: I discussed with the patient the risks of rifampin including but not limited to liver damage, kidney damage, red-orange body fluids, nausea/vomiting and severe allergy. Calcipotriene Counseling:  I discussed with the patient the risks of calcipotriene including but not limited to erythema, scaling, itching, and irritation. Dupixent Pregnancy And Lactation Text: This medication likely crosses the placenta but the risk for the fetus is uncertain. This medication is excreted in breast milk. Hydroxychloroquine Pregnancy And Lactation Text: This medication has been shown to cause fetal harm but it isn't assigned a Pregnancy Risk Category. There are small amounts excreted in breast milk. Prednisone Counseling:  I discussed with the patient the risks of prolonged use of prednisone including but not limited to weight gain, insomnia, osteoporosis, mood changes, diabetes, susceptibility to infection, glaucoma and high blood pressure.  In cases where prednisone use is prolonged, patients should be monitored with blood pressure checks, serum glucose levels and an eye exam.  Additionally, the patient may need to be placed on GI prophylaxis, PCP prophylaxis, and calcium and vitamin D supplementation and/or a bisphosphonate.  The patient verbalized understanding of the proper use and the possible adverse effects of prednisone.  All of the patient's questions and concerns were addressed. Detail Level: Detailed Acitretin Counseling:  I discussed with the patient the risks of acitretin including but not limited to hair loss, dry lips/skin/eyes, liver damage, hyperlipidemia, depression/suicidal ideation, photosensitivity.  Serious rare side effects can include but are not limited to pancreatitis, pseudotumor cerebri, bony changes, clot formation/stroke/heart attack.  Patient understands that alcohol is contraindicated since it can result in liver toxicity and significantly prolong the elimination of the drug by many years. Acitretin Pregnancy And Lactation Text: This medication is Pregnancy Category X and should not be given to women who are pregnant or may become pregnant in the future. This medication is excreted in breast milk. Terbinafine Pregnancy And Lactation Text: This medication is Pregnancy Category B and is considered safe during pregnancy. It is also excreted in breast milk and breast feeding isn't recommended. Ketoconazole Counseling:   Patient counseled regarding improving absorption with orange juice.  Adverse effects include but are not limited to breast enlargement, headache, diarrhea, nausea, upset stomach, liver function test abnormalities, taste disturbance, and stomach pain.  There is a rare possibility of liver failure that can occur when taking ketoconazole. The patient understands that monitoring of LFTs may be required, especially at baseline. The patient verbalized understanding of the proper use and possible adverse effects of ketoconazole.  All of the patient's questions and concerns were addressed. Dupixent Counseling: I discussed with the patient the risks of dupilumab including but not limited to eye infection and irritation, cold sores, injection site reactions, worsening of asthma, allergic reactions and increased risk of parasitic infection.  Live vaccines should be avoided while taking dupilumab. Dupilumab will also interact with certain medications such as warfarin and cyclosporine. The patient understands that monitoring is required and they must alert us or the primary physician if symptoms of infection or other concerning signs are noted. Rhofade Pregnancy And Lactation Text: This medication has not been assigned a Pregnancy Risk Category. It is unknown if the medication is excreted in breast milk. Azathioprine Counseling:  I discussed with the patient the risks of azathioprine including but not limited to myelosuppression, immunosuppression, hepatotoxicity, lymphoma, and infections.  The patient understands that monitoring is required including baseline LFTs, Creatinine, possible TPMP genotyping and weekly CBCs for the first month and then every 2 weeks thereafter.  The patient verbalized understanding of the proper use and possible adverse effects of azathioprine.  All of the patient's questions and concerns were addressed. Methotrexate Counseling:  Patient counseled regarding adverse effects of methotrexate including but not limited to nausea, vomiting, abnormalities in liver function tests. Patients may develop mouth sores, rash, diarrhea, and abnormalities in blood counts. The patient understands that monitoring is required including LFT's and blood counts.  There is a rare possibility of scarring of the liver and lung problems that can occur when taking methotrexate. Persistent nausea, loss of appetite, pale stools, dark urine, cough, and shortness of breath should be reported immediately. Patient advised to discontinue methotrexate treatment at least three months before attempting to become pregnant.  I discussed the need for folate supplements while taking methotrexate.  These supplements can decrease side effects during methotrexate treatment. The patient verbalized understanding of the proper use and possible adverse effects of methotrexate.  All of the patient's questions and concerns were addressed. Skyrizi Counseling: I discussed with the patient the risks of risankizumab-rzaa including but not limited to immunosuppression, and serious infections.  The patient understands that monitoring is required including a PPD at baseline and must alert us or the primary physician if symptoms of infection or other concerning signs are noted. Hydroxyzine Pregnancy And Lactation Text: This medication is not safe during pregnancy and should not be taken. It is also excreted in breast milk and breast feeding isn't recommended. Infliximab Counseling:  I discussed with the patient the risks of infliximab including but not limited to myelosuppression, immunosuppression, autoimmune hepatitis, demyelinating diseases, lymphoma, and serious infections.  The patient understands that monitoring is required including a PPD at baseline and must alert us or the primary physician if symptoms of infection or other concerning signs are noted. Nsaids Counseling: NSAID Counseling: I discussed with the patient that NSAIDs should be taken with food. Prolonged use of NSAIDs can result in the development of stomach ulcers.  Patient advised to stop taking NSAIDs if abdominal pain occurs.  The patient verbalized understanding of the proper use and possible adverse effects of NSAIDs.  All of the patient's questions and concerns were addressed. Isotretinoin Counseling: Patient should get monthly blood tests, not donate blood, not drive at night if vision affected, not share medication, and not undergo elective surgery for 6 months after tx completed. Side effects reviewed, pt to contact office should one occur. Ilumya Counseling: I discussed with the patient the risks of tildrakizumab including but not limited to immunosuppression, malignancy, posterior leukoencephalopathy syndrome, and serious infections.  The patient understands that monitoring is required including a PPD at baseline and must alert us or the primary physician if symptoms of infection or other concerning signs are noted. Clofazimine Counseling:  I discussed with the patient the risks of clofazimine including but not limited to skin and eye pigmentation, liver damage, nausea/vomiting, gastrointestinal bleeding and allergy. Cephalexin Pregnancy And Lactation Text: This medication is Pregnancy Category B and considered safe during pregnancy.  It is also excreted in breast milk but can be used safely for shorter doses. Clindamycin Pregnancy And Lactation Text: This medication can be used in pregnancy if certain situations. Clindamycin is also present in breast milk. Minoxidil Counseling: Minoxidil is a topical medication which can increase blood flow where it is applied. It is uncertain how this medication increases hair growth. Side effects are uncommon and include stinging and allergic reactions. Cyclosporine Counseling:  I discussed with the patient the risks of cyclosporine including but not limited to hypertension, gingival hyperplasia,myelosuppression, immunosuppression, liver damage, kidney damage, neurotoxicity, lymphoma, and serious infections. The patient understands that monitoring is required including baseline blood pressure, CBC, CMP, lipid panel and uric acid, and then 1-2 times monthly CMP and blood pressure. Winlevi Pregnancy And Lactation Text: This medication is considered safe during pregnancy and breastfeeding. Cephalexin Counseling: I counseled the patient regarding use of cephalexin as an antibiotic for prophylactic and/or therapeutic purposes. Cephalexin (commonly prescribed under brand name Keflex) is a cephalosporin antibiotic which is active against numerous classes of bacteria, including most skin bacteria. Side effects may include nausea, diarrhea, gastrointestinal upset, rash, hives, yeast infections, and in rare cases, hepatitis, kidney disease, seizures, fever, confusion, neurologic symptoms, and others. Patients with severe allergies to penicillin medications are cautioned that there is about a 10% incidence of cross-reactivity with cephalosporins. When possible, patients with penicillin allergies should use alternatives to cephalosporins for antibiotic therapy. Doxepin Pregnancy And Lactation Text: This medication is Pregnancy Category C and it isn't known if it is safe during pregnancy. It is also excreted in breast milk and breast feeding isn't recommended. Picato Counseling:  I discussed with the patient the risks of Picato including but not limited to erythema, scaling, itching, weeping, crusting, and pain. Siliq Counseling:  I discussed with the patient the risks of Siliq including but not limited to new or worsening depression, suicidal thoughts and behavior, immunosuppression, malignancy, posterior leukoencephalopathy syndrome, and serious infections.  The patient understands that monitoring is required including a PPD at baseline and must alert us or the primary physician if symptoms of infection or other concerning signs are noted. There is also a special program designed to monitor depression which is required with Siliq. Humira Counseling:  I discussed with the patient the risks of adalimumab including but not limited to myelosuppression, immunosuppression, autoimmune hepatitis, demyelinating diseases, lymphoma, and serious infections.  The patient understands that monitoring is required including a PPD at baseline and must alert us or the primary physician if symptoms of infection or other concerning signs are noted. Odomzo Counseling- I discussed with the patient the risks of Odomzo including but not limited to nausea, vomiting, diarrhea, constipation, weight loss, changes in the sense of taste, decreased appetite, muscle spasms, and hair loss.  The patient verbalized understanding of the proper use and possible adverse effects of Odomzo.  All of the patient's questions and concerns were addressed. Spironolactone Pregnancy And Lactation Text: This medication can cause feminization of the male fetus and should be avoided during pregnancy. The active metabolite is also found in breast milk. Cellcept Pregnancy And Lactation Text: This medication is Pregnancy Category D and isn't considered safe during pregnancy. It is unknown if this medication is excreted in breast milk. Topical Retinoid counseling:  Patient advised to apply a pea-sized amount only at bedtime and wait 30 minutes after washing their face before applying.  If too drying, patient may add a non-comedogenic moisturizer. The patient verbalized understanding of the proper use and possible adverse effects of retinoids.  All of the patient's questions and concerns were addressed. High Dose Vitamin A Pregnancy And Lactation Text: High dose vitamin A therapy is contraindicated during pregnancy and breast feeding. Taltz Counseling: I discussed with the patient the risks of ixekizumab including but not limited to immunosuppression, serious infections, worsening of inflammatory bowel disease and drug reactions.  The patient understands that monitoring is required including a PPD at baseline and must alert us or the primary physician if symptoms of infection or other concerning signs are noted. Sski Pregnancy And Lactation Text: This medication is Pregnancy Category D and isn't considered safe during pregnancy. It is excreted in breast milk. Tazorac Counseling:  Patient advised that medication is irritating and drying.  Patient may need to apply sparingly and wash off after an hour before eventually leaving it on overnight.  The patient verbalized understanding of the proper use and possible adverse effects of tazorac.  All of the patient's questions and concerns were addressed. Bexarotene Counseling:  I discussed with the patient the risks of bexarotene including but not limited to hair loss, dry lips/skin/eyes, liver abnormalities, hyperlipidemia, pancreatitis, depression/suicidal ideation, photosensitivity, drug rash/allergic reactions, hypothyroidism, anemia, leukopenia, infection, cataracts, and teratogenicity.  Patient understands that they will need regular blood tests to check lipid profile, liver function tests, white blood cell count, thyroid function tests and pregnancy test if applicable. Hydroxyzine Counseling: Patient advised that the medication is sedating and not to drive a car after taking this medication.  Patient informed of potential adverse effects including but not limited to dry mouth, urinary retention, and blurry vision.  The patient verbalized understanding of the proper use and possible adverse effects of hydroxyzine.  All of the patient's questions and concerns were addressed. Topical Clindamycin Counseling: Patient counseled that this medication may cause skin irritation or allergic reactions.  In the event of skin irritation, the patient was advised to reduce the amount of the drug applied or use it less frequently.   The patient verbalized understanding of the proper use and possible adverse effects of clindamycin.  All of the patient's questions and concerns were addressed. SSKI Counseling:  I discussed with the patient the risks of SSKI including but not limited to thyroid abnormalities, metallic taste, GI upset, fever, headache, acne, arthralgias, paraesthesias, lymphadenopathy, easy bleeding, arrhythmias, and allergic reaction. Albendazole Counseling:  I discussed with the patient the risks of albendazole including but not limited to cytopenia, kidney damage, nausea/vomiting and severe allergy.  The patient understands that this medication is being used in an off-label manner. Dapsone Counseling: I discussed with the patient the risks of dapsone including but not limited to hemolytic anemia, agranulocytosis, rashes, methemoglobinemia, kidney failure, peripheral neuropathy, headaches, GI upset, and liver toxicity.  Patients who start dapsone require monitoring including baseline LFTs and weekly CBCs for the first month, then every month thereafter.  The patient verbalized understanding of the proper use and possible adverse effects of dapsone.  All of the patient's questions and concerns were addressed. Metronidazole Counseling:  I discussed with the patient the risks of metronidazole including but not limited to seizures, nausea/vomiting, a metallic taste in the mouth, nausea/vomiting and severe allergy. Libtayo Counseling- I discussed with the patient the risks of Libtayo including but not limited to nausea, vomiting, diarrhea, and bone or muscle pain.  The patient verbalized understanding of the proper use and possible adverse effects of Libtayo.  All of the patient's questions and concerns were addressed. Finasteride Pregnancy And Lactation Text: This medication is absolutely contraindicated during pregnancy. It is unknown if it is excreted in breast milk. Griseofulvin Pregnancy And Lactation Text: This medication is Pregnancy Category X and is known to cause serious birth defects. It is unknown if this medication is excreted in breast milk but breast feeding should be avoided. Sarecycline Counseling: Patient advised regarding possible photosensitivity and discoloration of the teeth, skin, lips, tongue and gums.  Patient instructed to avoid sunlight, if possible.  When exposed to sunlight, patients should wear protective clothing, sunglasses, and sunscreen.  The patient was instructed to call the office immediately if the following severe adverse effects occur:  hearing changes, easy bruising/bleeding, severe headache, or vision changes.  The patient verbalized understanding of the proper use and possible adverse effects of sarecycline.  All of the patient's questions and concerns were addressed. Opioid Pregnancy And Lactation Text: These medications can lead to premature delivery and should be avoided during pregnancy. These medications are also present in breast milk in small amounts. Cimetidine Counseling:  I discussed with the patient the risks of Cimetidine including but not limited to gynecomastia, headache, diarrhea, nausea, drowsiness, arrhythmias, pancreatitis, skin rashes, psychosis, bone marrow suppression and kidney toxicity. Cimzia Counseling:  I discussed with the patient the risks of Cimzia including but not limited to immunosuppression, allergic reactions and infections.  The patient understands that monitoring is required including a PPD at baseline and must alert us or the primary physician if symptoms of infection or other concerning signs are noted. Glycopyrrolate Pregnancy And Lactation Text: This medication is Pregnancy Category B and is considered safe during pregnancy. It is unknown if it is excreted breast milk. Enbrel Counseling:  I discussed with the patient the risks of etanercept including but not limited to myelosuppression, immunosuppression, autoimmune hepatitis, demyelinating diseases, lymphoma, and infections.  The patient understands that monitoring is required including a PPD at baseline and must alert us or the primary physician if symptoms of infection or other concerning signs are noted. Methotrexate Pregnancy And Lactation Text: This medication is Pregnancy Category X and is known to cause fetal harm. This medication is excreted in breast [id number]